# Patient Record
Sex: FEMALE | Race: WHITE | NOT HISPANIC OR LATINO | Employment: FULL TIME | URBAN - METROPOLITAN AREA
[De-identification: names, ages, dates, MRNs, and addresses within clinical notes are randomized per-mention and may not be internally consistent; named-entity substitution may affect disease eponyms.]

---

## 2018-03-16 ENCOUNTER — CONVERSION ENCOUNTER (OUTPATIENT)
Dept: CARDIOLOGY | Facility: CLINIC | Age: 48
End: 2018-03-16

## 2018-03-16 ENCOUNTER — OFFICE VISIT CONVERTED (OUTPATIENT)
Dept: CARDIOLOGY | Facility: CLINIC | Age: 48
End: 2018-03-16
Attending: INTERNAL MEDICINE

## 2018-06-29 ENCOUNTER — CONVERSION ENCOUNTER (OUTPATIENT)
Dept: GENERAL RADIOLOGY | Facility: HOSPITAL | Age: 48
End: 2018-06-29

## 2019-02-08 ENCOUNTER — HOSPITAL ENCOUNTER (OUTPATIENT)
Dept: LAB | Facility: HOSPITAL | Age: 49
Discharge: HOME | End: 2019-02-08

## 2019-02-08 LAB
25(OH)D3 SERPL-MCNC: 35.3 NG/ML (ref 30–100)
ALBUMIN SERPL-MCNC: 4.1 G/DL (ref 3.5–5)
ALBUMIN/GLOB SERPL: 1.3 {RATIO} (ref 1.4–2.6)
ALP SERPL-CCNC: 71 U/L (ref 42–98)
ALT SERPL-CCNC: 31 U/L (ref 10–40)
ANION GAP SERPL CALC-SCNC: 19 MMOL/L (ref 8–19)
AST SERPL-CCNC: 23 U/L (ref 15–50)
BASOPHILS # BLD AUTO: 0.06 10*3/UL (ref 0–0.2)
BASOPHILS NFR BLD AUTO: 0.76 % (ref 0–3)
BILIRUB SERPL-MCNC: 0.34 MG/DL (ref 0.2–1.3)
BUN SERPL-MCNC: 17 MG/DL (ref 5–25)
BUN/CREAT SERPL: 24 {RATIO} (ref 6–20)
CALCIUM SERPL-MCNC: 9.3 MG/DL (ref 8.7–10.4)
CHLORIDE SERPL-SCNC: 102 MMOL/L (ref 99–111)
CHOLEST SERPL-MCNC: 171 MG/DL (ref 107–200)
CHOLEST/HDLC SERPL: 4.3 {RATIO} (ref 3–6)
CONV BILI, CONJUGATED: <0.2 MG/DL (ref 0–0.6)
CONV CO2: 21 MMOL/L (ref 22–32)
CONV TOTAL PROTEIN: 7.2 G/DL (ref 6.3–8.2)
CONV UNCONJUGATED BILIRUBIN: 0.1 MG/DL (ref 0–1.1)
CORTIS AM PEAK SERPL-MCNC: 11.5 UG/DL (ref 6–18.4)
CREAT UR-MCNC: 0.71 MG/DL (ref 0.5–0.9)
EOSINOPHIL # BLD AUTO: 0.27 10*3/UL (ref 0–0.7)
EOSINOPHIL # BLD AUTO: 3.54 % (ref 0–7)
ERYTHROCYTE [DISTWIDTH] IN BLOOD BY AUTOMATED COUNT: 11.7 % (ref 11.5–14.5)
EST. AVERAGE GLUCOSE BLD GHB EST-MCNC: 108 MG/DL
GFR SERPLBLD BASED ON 1.73 SQ M-ARVRAT: >60 ML/MIN/{1.73_M2}
GLOBULIN UR ELPH-MCNC: 3.1 G/DL (ref 2–3.5)
GLUCOSE SERPL-MCNC: 106 MG/DL (ref 65–99)
HBA1C MFR BLD: 14 G/DL (ref 12–16)
HBA1C MFR BLD: 5.4 % (ref 3.5–5.7)
HCT VFR BLD AUTO: 39.5 % (ref 37–47)
HDLC SERPL-MCNC: 40 MG/DL (ref 40–60)
LDLC SERPL CALC-MCNC: 108 MG/DL (ref 70–100)
LYMPHOCYTES # BLD AUTO: 1.75 10*3/UL (ref 1–5)
MCH RBC QN AUTO: 29.7 PG (ref 27–31)
MCHC RBC AUTO-ENTMCNC: 35.5 G/DL (ref 33–37)
MCV RBC AUTO: 83.7 FL (ref 81–99)
MONOCYTES # BLD AUTO: 0.53 10*3/UL (ref 0.2–1.2)
MONOCYTES NFR BLD AUTO: 7.06 % (ref 3–10)
NEUTROPHILS # BLD AUTO: 4.88 10*3/UL (ref 2–8)
NEUTROPHILS NFR BLD AUTO: 65.3 % (ref 30–85)
NRBC BLD AUTO-RTO: 0 % (ref 0–0.01)
OSMOLALITY SERPL CALC.SUM OF ELEC: 288 MOSM/KG (ref 273–304)
PLATELET # BLD AUTO: 273 10*3/UL (ref 130–400)
PMV BLD AUTO: 8.4 FL (ref 7.4–10.4)
POTASSIUM SERPL-SCNC: 4 MMOL/L (ref 3.5–5.3)
RBC # BLD AUTO: 4.72 10*6/UL (ref 4.2–5.4)
SODIUM SERPL-SCNC: 138 MMOL/L (ref 135–147)
T4 FREE SERPL-MCNC: 1.2 NG/DL (ref 0.9–1.8)
TRIGL SERPL-MCNC: 113 MG/DL (ref 40–150)
TSH SERPL-ACNC: 0.77 M[IU]/L (ref 0.27–4.2)
VARIANT LYMPHS NFR BLD MANUAL: 23.3 % (ref 20–45)
VLDLC SERPL-MCNC: 23 MG/DL (ref 5–37)
WBC # BLD AUTO: 7.48 10*3/UL (ref 4.8–10.8)

## 2019-03-08 ENCOUNTER — OFFICE VISIT CONVERTED (OUTPATIENT)
Dept: FAMILY MEDICINE CLINIC | Facility: CLINIC | Age: 49
End: 2019-03-08
Attending: PHYSICIAN ASSISTANT

## 2019-05-21 ENCOUNTER — HOSPITAL ENCOUNTER (OUTPATIENT)
Dept: OTHER | Facility: HOSPITAL | Age: 49
Discharge: HOME | End: 2019-05-21
Attending: OBSTETRICS & GYNECOLOGY

## 2019-05-31 ENCOUNTER — HOSPITAL ENCOUNTER (OUTPATIENT)
Dept: PERIOP | Facility: HOSPITAL | Age: 49
Setting detail: HOSPITAL OUTPATIENT SURGERY
Discharge: HOME | End: 2019-05-31
Attending: OBSTETRICS & GYNECOLOGY

## 2019-05-31 LAB — HCG UR QL: NEGATIVE

## 2019-06-19 ENCOUNTER — OFFICE VISIT CONVERTED (OUTPATIENT)
Dept: CARDIOLOGY | Facility: CLINIC | Age: 49
End: 2019-06-19
Attending: INTERNAL MEDICINE

## 2019-08-02 ENCOUNTER — HOSPITAL ENCOUNTER (OUTPATIENT)
Dept: GENERAL RADIOLOGY | Facility: HOSPITAL | Age: 49
Discharge: HOME | End: 2019-08-02
Attending: OBSTETRICS & GYNECOLOGY

## 2019-08-19 ENCOUNTER — HOSPITAL ENCOUNTER (OUTPATIENT)
Dept: URGENT CARE | Facility: CLINIC | Age: 49
Discharge: HOME | End: 2019-08-19
Attending: FAMILY MEDICINE

## 2019-09-24 ENCOUNTER — HOSPITAL ENCOUNTER (OUTPATIENT)
Dept: LAB | Facility: HOSPITAL | Age: 49
Discharge: HOME | End: 2019-09-24
Attending: PHYSICIAN ASSISTANT

## 2019-09-24 ENCOUNTER — OFFICE VISIT CONVERTED (OUTPATIENT)
Dept: FAMILY MEDICINE CLINIC | Facility: CLINIC | Age: 49
End: 2019-09-24
Attending: PHYSICIAN ASSISTANT

## 2019-09-24 LAB
25(OH)D3 SERPL-MCNC: 41.3 NG/ML (ref 30–100)
ALBUMIN SERPL-MCNC: 4.2 G/DL (ref 3.5–5)
ALBUMIN/GLOB SERPL: 1.4 {RATIO} (ref 1.4–2.6)
ALP SERPL-CCNC: 90 U/L (ref 42–98)
ALT SERPL-CCNC: 16 U/L (ref 10–40)
ANION GAP SERPL CALC-SCNC: 17 MMOL/L (ref 8–19)
APPEARANCE UR: CLEAR
AST SERPL-CCNC: 17 U/L (ref 15–50)
BASOPHILS # BLD AUTO: 0.06 10*3/UL (ref 0–0.2)
BASOPHILS NFR BLD AUTO: 0.9 % (ref 0–3)
BILIRUB SERPL-MCNC: 0.29 MG/DL (ref 0.2–1.3)
BILIRUB UR QL: NEGATIVE
BUN SERPL-MCNC: 12 MG/DL (ref 5–25)
BUN/CREAT SERPL: 17 {RATIO} (ref 6–20)
CALCIUM SERPL-MCNC: 9 MG/DL (ref 8.7–10.4)
CHLORIDE SERPL-SCNC: 102 MMOL/L (ref 99–111)
CHOLEST SERPL-MCNC: 147 MG/DL (ref 107–200)
CHOLEST/HDLC SERPL: 3.5 {RATIO} (ref 3–6)
COLOR UR: YELLOW
CONV ABS IMM GRAN: 0.02 10*3/UL (ref 0–0.2)
CONV CO2: 23 MMOL/L (ref 22–32)
CONV COLLECTION SOURCE (UA): NORMAL
CONV IMMATURE GRAN: 0.3 % (ref 0–1.8)
CONV TOTAL PROTEIN: 7.1 G/DL (ref 6.3–8.2)
CONV UROBILINOGEN IN URINE BY AUTOMATED TEST STRIP: 0.2 {EHRLICHU}/DL (ref 0.1–1)
CREAT UR-MCNC: 0.69 MG/DL (ref 0.5–0.9)
DEPRECATED RDW RBC AUTO: 40 FL (ref 36.4–46.3)
EOSINOPHIL # BLD AUTO: 0.17 10*3/UL (ref 0–0.7)
EOSINOPHIL # BLD AUTO: 2.7 % (ref 0–7)
ERYTHROCYTE [DISTWIDTH] IN BLOOD BY AUTOMATED COUNT: 12.5 % (ref 11.7–14.4)
GFR SERPLBLD BASED ON 1.73 SQ M-ARVRAT: >60 ML/MIN/{1.73_M2}
GLOBULIN UR ELPH-MCNC: 2.9 G/DL (ref 2–3.5)
GLUCOSE SERPL-MCNC: 98 MG/DL (ref 65–99)
GLUCOSE UR QL: NEGATIVE MG/DL
HCT VFR BLD AUTO: 39.8 % (ref 37–47)
HDLC SERPL-MCNC: 42 MG/DL (ref 40–60)
HGB BLD-MCNC: 13.1 G/DL (ref 12–16)
HGB UR QL STRIP: NEGATIVE
KETONES UR QL STRIP: NEGATIVE MG/DL
LDLC SERPL CALC-MCNC: 84 MG/DL (ref 70–100)
LEUKOCYTE ESTERASE UR QL STRIP: NEGATIVE
LYMPHOCYTES # BLD AUTO: 1.99 10*3/UL (ref 1–5)
LYMPHOCYTES NFR BLD AUTO: 31.3 % (ref 20–45)
MCH RBC QN AUTO: 28.7 PG (ref 27–31)
MCHC RBC AUTO-ENTMCNC: 32.9 G/DL (ref 33–37)
MCV RBC AUTO: 87.3 FL (ref 81–99)
MONOCYTES # BLD AUTO: 0.4 10*3/UL (ref 0.2–1.2)
MONOCYTES NFR BLD AUTO: 6.3 % (ref 3–10)
NEUTROPHILS # BLD AUTO: 3.71 10*3/UL (ref 2–8)
NEUTROPHILS NFR BLD AUTO: 58.5 % (ref 30–85)
NITRITE UR QL STRIP: NEGATIVE
NRBC CBCN: 0 % (ref 0–0.7)
OSMOLALITY SERPL CALC.SUM OF ELEC: 286 MOSM/KG (ref 273–304)
PH UR STRIP.AUTO: 8 [PH] (ref 5–8)
PLATELET # BLD AUTO: 298 10*3/UL (ref 130–400)
PMV BLD AUTO: 11.3 FL (ref 9.4–12.3)
POTASSIUM SERPL-SCNC: 4 MMOL/L (ref 3.5–5.3)
PROT UR QL: NEGATIVE MG/DL
RBC # BLD AUTO: 4.56 10*6/UL (ref 4.2–5.4)
SODIUM SERPL-SCNC: 138 MMOL/L (ref 135–147)
SP GR UR: 1.02 (ref 1–1.03)
T4 FREE SERPL-MCNC: 1.1 NG/DL (ref 0.9–1.8)
TRIGL SERPL-MCNC: 107 MG/DL (ref 40–150)
TSH SERPL-ACNC: 1.34 M[IU]/L (ref 0.27–4.2)
VLDLC SERPL-MCNC: 21 MG/DL (ref 5–37)
WBC # BLD AUTO: 6.35 10*3/UL (ref 4.8–10.8)

## 2019-10-15 ENCOUNTER — CONVERSION ENCOUNTER (OUTPATIENT)
Dept: SURGERY | Facility: CLINIC | Age: 49
End: 2019-10-15

## 2019-10-15 ENCOUNTER — OFFICE VISIT CONVERTED (OUTPATIENT)
Dept: SURGERY | Facility: CLINIC | Age: 49
End: 2019-10-15
Attending: SURGERY

## 2019-12-27 ENCOUNTER — HOSPITAL ENCOUNTER (OUTPATIENT)
Dept: PERIOP | Facility: HOSPITAL | Age: 49
Setting detail: HOSPITAL OUTPATIENT SURGERY
Discharge: HOME | End: 2019-12-27
Attending: SURGERY

## 2019-12-27 LAB — HCG UR QL: NEGATIVE

## 2020-01-10 ENCOUNTER — OFFICE VISIT CONVERTED (OUTPATIENT)
Dept: SURGERY | Facility: CLINIC | Age: 50
End: 2020-01-10
Attending: SURGERY

## 2020-07-28 ENCOUNTER — HOSPITAL ENCOUNTER (OUTPATIENT)
Dept: LAB | Facility: HOSPITAL | Age: 50
Discharge: HOME | End: 2020-07-28

## 2020-07-28 LAB
25(OH)D3 SERPL-MCNC: 47.6 NG/ML (ref 30–100)
CORTIS SERPL-MCNC: 15.8 UG/DL (ref 4.3–22.4)
T4 FREE SERPL-MCNC: 1 NG/DL (ref 0.9–1.8)
TSH SERPL-ACNC: 1.38 M[IU]/L (ref 0.27–4.2)

## 2020-07-29 LAB — T3FREE SERPL-MCNC: 2.7 PG/ML (ref 2–4.4)

## 2020-08-11 ENCOUNTER — HOSPITAL ENCOUNTER (OUTPATIENT)
Dept: GENERAL RADIOLOGY | Facility: HOSPITAL | Age: 50
Discharge: HOME | End: 2020-08-11
Attending: OBSTETRICS & GYNECOLOGY

## 2020-08-13 ENCOUNTER — OFFICE VISIT CONVERTED (OUTPATIENT)
Dept: CARDIOLOGY | Facility: CLINIC | Age: 50
End: 2020-08-13
Attending: INTERNAL MEDICINE

## 2020-10-20 ENCOUNTER — HOSPITAL ENCOUNTER (OUTPATIENT)
Dept: URGENT CARE | Facility: CLINIC | Age: 50
Discharge: HOME | End: 2020-10-20

## 2020-10-23 LAB — SARS-COV-2 RNA SPEC QL NAA+PROBE: NOT DETECTED

## 2020-12-17 ENCOUNTER — OFFICE VISIT CONVERTED (OUTPATIENT)
Dept: FAMILY MEDICINE CLINIC | Facility: CLINIC | Age: 50
End: 2020-12-17
Attending: PHYSICIAN ASSISTANT

## 2020-12-17 ENCOUNTER — HOSPITAL ENCOUNTER (OUTPATIENT)
Dept: LAB | Facility: HOSPITAL | Age: 50
Discharge: HOME | End: 2020-12-17
Attending: PHYSICIAN ASSISTANT

## 2020-12-17 LAB
25(OH)D3 SERPL-MCNC: 33.4 NG/ML (ref 30–100)
ALBUMIN SERPL-MCNC: 4 G/DL (ref 3.5–5)
ALBUMIN/GLOB SERPL: 1.3 {RATIO} (ref 1.4–2.6)
ALP SERPL-CCNC: 102 U/L (ref 42–98)
ALT SERPL-CCNC: 13 U/L (ref 10–40)
ANION GAP SERPL CALC-SCNC: 16 MMOL/L (ref 8–19)
APPEARANCE UR: ABNORMAL
AST SERPL-CCNC: 15 U/L (ref 15–50)
BASOPHILS # BLD AUTO: 0.06 10*3/UL (ref 0–0.2)
BASOPHILS NFR BLD AUTO: 0.7 % (ref 0–3)
BILIRUB SERPL-MCNC: 0.23 MG/DL (ref 0.2–1.3)
BILIRUB UR QL: NEGATIVE
BUN SERPL-MCNC: 12 MG/DL (ref 5–25)
BUN/CREAT SERPL: 20 {RATIO} (ref 6–20)
CALCIUM SERPL-MCNC: 8.5 MG/DL (ref 8.7–10.4)
CHLORIDE SERPL-SCNC: 104 MMOL/L (ref 99–111)
CHOLEST SERPL-MCNC: 188 MG/DL (ref 107–200)
CHOLEST/HDLC SERPL: 3.9 {RATIO} (ref 3–6)
COLOR UR: YELLOW
CONV ABS IMM GRAN: 0.03 10*3/UL (ref 0–0.2)
CONV BACTERIA: ABNORMAL
CONV CO2: 21 MMOL/L (ref 22–32)
CONV COLLECTION SOURCE (UA): ABNORMAL
CONV CRYSTALS: ABNORMAL /[HPF]
CONV HYALINE CASTS IN URINE MICRO: ABNORMAL /[LPF]
CONV IMMATURE GRAN: 0.3 % (ref 0–1.8)
CONV TOTAL PROTEIN: 7 G/DL (ref 6.3–8.2)
CONV UROBILINOGEN IN URINE BY AUTOMATED TEST STRIP: 0.2 {EHRLICHU}/DL (ref 0.1–1)
CREAT UR-MCNC: 0.61 MG/DL (ref 0.5–0.9)
DEPRECATED RDW RBC AUTO: 39.7 FL (ref 36.4–46.3)
EOSINOPHIL # BLD AUTO: 0.18 10*3/UL (ref 0–0.7)
EOSINOPHIL # BLD AUTO: 2 % (ref 0–7)
ERYTHROCYTE [DISTWIDTH] IN BLOOD BY AUTOMATED COUNT: 12.6 % (ref 11.7–14.4)
GFR SERPLBLD BASED ON 1.73 SQ M-ARVRAT: >60 ML/MIN/{1.73_M2}
GLOBULIN UR ELPH-MCNC: 3 G/DL (ref 2–3.5)
GLUCOSE SERPL-MCNC: 103 MG/DL (ref 65–99)
GLUCOSE UR QL: NEGATIVE MG/DL
HCT VFR BLD AUTO: 39.4 % (ref 37–47)
HDLC SERPL-MCNC: 48 MG/DL (ref 40–60)
HGB BLD-MCNC: 13 G/DL (ref 12–16)
HGB UR QL STRIP: NEGATIVE
KETONES UR QL STRIP: NEGATIVE MG/DL
LDLC SERPL CALC-MCNC: 109 MG/DL (ref 70–100)
LEUKOCYTE ESTERASE UR QL STRIP: ABNORMAL
LYMPHOCYTES # BLD AUTO: 1.8 10*3/UL (ref 1–5)
LYMPHOCYTES NFR BLD AUTO: 19.5 % (ref 20–45)
MCH RBC QN AUTO: 28.6 PG (ref 27–31)
MCHC RBC AUTO-ENTMCNC: 33 G/DL (ref 33–37)
MCV RBC AUTO: 86.6 FL (ref 81–99)
MONOCYTES # BLD AUTO: 0.39 10*3/UL (ref 0.2–1.2)
MONOCYTES NFR BLD AUTO: 4.2 % (ref 3–10)
NEUTROPHILS # BLD AUTO: 6.77 10*3/UL (ref 2–8)
NEUTROPHILS NFR BLD AUTO: 73.3 % (ref 30–85)
NITRITE UR QL STRIP: NEGATIVE
NRBC CBCN: 0 % (ref 0–0.7)
OSMOLALITY SERPL CALC.SUM OF ELEC: 284 MOSM/KG (ref 273–304)
PH UR STRIP.AUTO: 5 [PH] (ref 5–8)
PLATELET # BLD AUTO: 331 10*3/UL (ref 130–400)
PMV BLD AUTO: 11 FL (ref 9.4–12.3)
POTASSIUM SERPL-SCNC: 4 MMOL/L (ref 3.5–5.3)
PROT UR QL: NEGATIVE MG/DL
RBC # BLD AUTO: 4.55 10*6/UL (ref 4.2–5.4)
RBC #/AREA URNS HPF: ABNORMAL /[HPF]
SODIUM SERPL-SCNC: 137 MMOL/L (ref 135–147)
SP GR UR: 1.02 (ref 1–1.03)
SQUAMOUS SPT QL MICRO: ABNORMAL /[HPF]
T4 FREE SERPL-MCNC: 1 NG/DL (ref 0.9–1.8)
TRIGL SERPL-MCNC: 154 MG/DL (ref 40–150)
TSH SERPL-ACNC: 2.82 M[IU]/L (ref 0.27–4.2)
VLDLC SERPL-MCNC: 31 MG/DL (ref 5–37)
WBC # BLD AUTO: 9.23 10*3/UL (ref 4.8–10.8)
WBC #/AREA URNS HPF: ABNORMAL /[HPF]

## 2020-12-22 ENCOUNTER — HOSPITAL ENCOUNTER (OUTPATIENT)
Dept: LAB | Facility: HOSPITAL | Age: 50
Discharge: HOME | End: 2020-12-22
Attending: PHYSICIAN ASSISTANT

## 2020-12-22 LAB
MAGNESIUM SERPL-MCNC: 1.91 MG/DL (ref 1.6–2.3)
PTH-INTACT SERPL-MCNC: 51.7 PG/ML (ref 11.1–79.5)

## 2020-12-23 LAB — CONV CALCIUM IONIZED: 5 MG/DL (ref 4.5–5.6)

## 2020-12-24 LAB
ALP BONE CFR SERPL: 18 % (ref 14–68)
ALP INTEST CFR SERPL: 4 % (ref 0–18)
ALP LIVER CFR SERPL: 77 % (ref 18–85)
ALP SERPL-CCNC: 93 IU/L (ref 39–117)
BACTERIA UR CULT: NORMAL

## 2021-03-19 ENCOUNTER — OFFICE VISIT CONVERTED (OUTPATIENT)
Dept: FAMILY MEDICINE CLINIC | Facility: CLINIC | Age: 51
End: 2021-03-19
Attending: PHYSICIAN ASSISTANT

## 2021-05-13 NOTE — PROGRESS NOTES
"   Progress Note      Patient Name: Livia Zaman   Patient ID: [id number]   Sex: Female   YOB: 1970    Primary Care Provider: Zander Valenzuela PA-C   Referring Provider: Zander Valenzuela PA-C    Visit Date: August 13, 2020    Provider: Marty Muñoz MD   Location: [facility]   Location Address: [address] [place] [address], [facility]   [place]  [id number]   Location Phone: [phone]          Chief Complaint  · PVCs.       History Of Present Illness  REFERRING CARE PROVIDER: Zander Valenzuela PA-C   Livia Zaman is a 49 year old /White female with a history of PVCs, obstructive sleep apnea, on CPAP, and some type of neuroendocrine tumor, which is being followed but has recently increased in size. The patient does report stable palpitation issues.   PAST MEDICAL HISTORY: Hypothyroidism; Neuroendocrine tumor; PVCs; Obstructive sleep apnea.   FAMILY HISTORY: Positive for hypertension and heart disease. Negative for diabetes mellitus.   PSYCHOSOCIAL HISTORY: Denies tobacco use. Rarely consumes alcohol. Admits mood changes and depression.   CURRENT MEDICATIONS: Lo Loestrin daily; Ativan 1 mg q. h.s.; levothyroxine 125 mcg daily; Lexapro 20 mg daily.       Review of Systems  · Cardiovascular  o Admits  o : palpitations (fast, fluttering, or skipping beats)  o Denies  o : swelling (feet, ankles, hands), shortness of breath while walking or lying flat, chest pain or angina pectoris   · Respiratory  o Denies  o : chronic or frequent cough, asthma or wheezing      Vitals  Date Time BP Position Site L\R Cuff Size HR RR TEMP (F) WT  HT  BMI kg/m2 BSA m2 O2 Sat        08/13/2020 08:27 /64 Sitting    74 - R   229lbs 0oz 5'  3\" 40.57 2.15           Physical Examination  · Constitutional  o Appearance  o : Awake, alert, in no acute distress.   · Eyes  o Conjunctivae  o : Normal.  · Ears, Nose, Mouth and Throat  o Oral Cavity  o :   § Oral Mucosa  § : " Normal.  · Neck  o Inspection/Palpation  o : No JVD. Good carotid upstroke. No thyromegaly.  · Respiratory  o Respiratory  o : Good respiratory effort. Clear to percussion and auscultation.  · Cardiovascular  o Heart  o :   § Auscultation of Heart  § : S1, S2 normal. Regular rate and rhythm without murmurs, gallops, or rubs.  o Peripheral Vascular System  o :   § Extremities  § : Good femoral and pedal pulses. No pedal edema.  · Gastrointestinal  o Abdominal Examination  o : Soft. No tenderness or masses felt. No hepatosplenomegaly. Abdominal aorta is not palpable.          Assessment     ASSESSMENT & PLAN:    1.  PVCs, symptomatically stable.  Patient was not interested in trying any rate-control medications for        symptom treatment.  Encouraged weight loss and exercise and avoidance of stimulants.  2.  Neuroendocrine tumor.        Marty Muñoz MD  JH:vm             Electronically Signed by: Awilda Reese-, Other -Author on August 17, 2020 10:10:49 AM  Electronically Co-signed by: Marty Muñoz MD -Reviewer on August 17, 2020 05:46:53 PM

## 2021-05-14 VITALS
OXYGEN SATURATION: 98 % | WEIGHT: 220 LBS | BODY MASS INDEX: 38.98 KG/M2 | DIASTOLIC BLOOD PRESSURE: 77 MMHG | HEART RATE: 94 BPM | HEIGHT: 63 IN | SYSTOLIC BLOOD PRESSURE: 137 MMHG

## 2021-05-14 VITALS
OXYGEN SATURATION: 97 % | BODY MASS INDEX: 42.35 KG/M2 | SYSTOLIC BLOOD PRESSURE: 118 MMHG | HEART RATE: 90 BPM | DIASTOLIC BLOOD PRESSURE: 75 MMHG | HEIGHT: 63 IN | WEIGHT: 239 LBS

## 2021-05-14 NOTE — PROGRESS NOTES
Progress Note      Patient Name: Livia Zaman   Patient ID: [id number]   Sex: Female   YOB: 1970    Primary Care Provider: Zander Valenzuela PA-C   Referring Provider: Zander Valenzuela PA-C    Visit Date: March 19, 2021    Provider: Zander Valenzuela PA-C   [place] [facility] [place] Address[place] [address] [place] [address], [address]  [place], [place]  [id number]   Location Phone: [phone]          Chief Complaint  · routine follow up  · bilateral ears itch      History Of Present Illness  Livia Zaman is a 50 year old /White female who presents for evaluation and treatment of: routine follow up.      pt presents today for routine follow up.    pt states both of her ears have been itching for a couple of weeks.    no other issues or complaint    pt has had both Covid vacc  Anxiety and depression controlled with meds and seeing psych on a regular basis    Labs 12/20  flu 10/20  wade 12/20  UDS 12/20    Pt is doing well overall, she is working as home health nurse    No CP, SOA, HA    Pancreatic mass stable, has CT every 6 mos.    Obesity - pt has lost 19 lbs since being phentermine, she wishes to continue for another couple of mos.       Past Medical History  Disease Name Date Onset Notes   Allergic Rhinitis --  --    Anxiety --  --    Hypothyroidism 03/08/2019 --    Insomnia --  --    Pancreatic lesion 09/25/2019 --          Past Surgical History  Procedure Name Date Notes   Cesarian Section --  --    Cholecystectomy --  --          Medication List  Name Date Started Instructions   Ativan 1 mg oral tablet  take 1-2 tablets by oral route daily   escitalopram oxalate 20 mg oral tablet  take 1.5 tablets by oral route daily   levothyroxine 125 mcg oral tablet 11/09/2020 take 1 tablet (125 mcg) by oral route once daily for 90 days   Low-Ogestrel (28) 0.3-30 mg-mcg oral tablet  take 1 tablet by oral route once daily   phentermine 37.5 mg oral tablet 03/19/2021 take 1 tablet (37.5 mg) by  "oral route once daily one or two hours after breakfast   Zyrtec 10 mg oral tablet  take 1 tablet (10 mg) by oral route once daily         Allergy List  Allergen Name Date Reaction Notes   Pristiq --  --  --          Family Medical History  Disease Name Relative/Age Notes   Heart Disease Father/  Grandfather (maternal)/  Grandfather (paternal)/  Grandmother (paternal)/   --    Hypertension Aunt/  Grandfather (maternal)/  Grandmother (maternal)/  Mother/   --    Prostate cancer Father/  Grandfather (maternal)/   --    Skin Carcinoma Father/  Uncle/   --          Social History  Finding Status Start/Stop Quantity Notes   Alcohol Never --/-- --  --    Exercises regularly --  --/-- --  2-3 times per week    --  --/-- --  --    Tobacco Never --/-- --  --          Review of Systems  · Constitutional  o Admits  o : weight loss  o Denies  o : fever, fatigue, weight gain  · Cardiovascular  o Denies  o : lower extremity edema, claudication, chest pressure, palpitations  · Respiratory  o Denies  o : shortness of breath, wheezing, cough, hemoptysis, dyspnea on exertion  · Gastrointestinal  o Denies  o : nausea, vomiting, diarrhea, constipation, abdominal pain      Vitals  Date Time BP Position Site L\R Cuff Size HR RR TEMP (F) WT  HT  BMI kg/m2 BSA m2 O2 Sat FR L/min FiO2 HC       03/19/2021 07:21 /77 Sitting    94 - R   220lbs 0oz 5'  3\" 38.97 2.11 98 %            Physical Examination  · Constitutional  o Appearance  o : overweight, well developed  · Head and Face  o Head  o : normocephalic, atraumatic  · Ears, Nose, Mouth and Throat  o Ears  o :   § External Ears  § : external auditory canal appearance normal, no discharge present  § Otoscopic Examination  § : tympanic membranes pearly white/gray bilaterally  o Nose  o :   § External Nose  § : no lesions noted  § Nasopharynx  § : no discharge present  o Oral Cavity  o :   § Oral Mucosa  § : oral mucosa light pink  o Throat  o :   § Oropharynx  § : tonsils without " exudate, no palatal petechiae  · Neck  o Inspection/Palpation  o : normal appearance, no masses or tenderness, trachea midline  o Thyroid  o : gland size normal, nontender, no nodules or masses present on palpation  · Respiratory  o Respiratory Effort  o : breathing unlabored  o Inspection of Chest  o : chest rise symmetric bilaterally  o Auscultation of Lungs  o : clear to auscultation bilaterally throughout inspiration and expiration  · Cardiovascular  o Heart  o :   § Auscultation of Heart  § : regular rate and rhythm, no murmurs, gallops or rubs  o Peripheral Vascular System  o :   § Extremities  § : no edema  · Lymphatic  o Neck  o : no cervical lymphadenopathy, no supraclavicular lymphadenopathy  · Psychiatric  o Mood and Affect  o : mood normal, affect appropriate          Assessment  · Hypothyroidism     244.9/E03.9  · Insomnia, unspecified     780.52/G47.00  · Class 2 severe obesity due to excess calories with serious comorbidity and body mass index (BMI) of 38.0 to 38.9 in adult       Morbid (severe) obesity due to excess calories     278.01/E66.01  Body mass index [BMI] 38.0-38.9, adult     278.01/Z68.38  · Screening for depression     V79.0/Z13.89  · Need for influenza vaccination     V04.81/Z23  · Pancreatic lesion     577.9/K86.9      Plan  · Orders  o ACO-18: Negative screen for clinical depression using a standardized tool () - V79.0/Z13.89 - 03/19/2021  o ACO-14: Influenza immunization was not administered for reasons documented () - V04.81/Z23 - 03/19/2021   rcvd 10/20  o MANUEL Report (KASPR) - - 03/19/2021  o ACO-39: Current medications updated and reviewed (1159F, ) - - 03/19/2021  · Medications  o phentermine 37.5 mg oral tablet   SIG: take 1 tablet (37.5 mg) by oral route once daily one or two hours after breakfast   DISP: (30) Tablet with 2 refills  Refilled on 03/19/2021     o Medications have been Reconciled  o Transition of Care or Provider Policy  · Instructions  o Avoid  any electronic use for at least 30 minutes prior to bed time. Cell phone screens, tablets and TVs imitate daylight, so your brain can become confused on the time of day. No caffeine use in the late afternoon and evenings.  o Depression Screen completed and scanned into the EMR under the designated folder within the patient's documents.  o Today's PHQ-9 result is _1__  o Flu vaccine declined.  o Obtained a written consent for MANUEL query. Discussed the risk and benefits of the use of controlled substances with the patient, including the risk of tolerance and drug dependence. The patient has been counseled on the need to have an exit strategy, including potentially discontinuing the use of controlled substances. MANUEL has or will be reviewed as soon as it becomes avaliable.  o See note for indication of controlled substance. Manuel and drug screen have been reviewed. Controlled substance agreement signed and scanned into chart. After discussion of risks and benefits of medication, I have determined patient is suitable for Rx while demonstrating the ability to safely follow and administer the medication plan. Patient understands the expectation that medication directions cannot be adjusted without a provider's written approval.   o Take all medications as prescribed/directed.  o Patient instructed/educated on their diet and exercise program.  o Patient was educated/instructed on their diagnosis, treatment and medications prior to discharge from the clinic today.  o Discussed Covid-19 precautions including, but not limited to, social distancing, avoid touching your face, and hand washing.             Electronically Signed by: Zander Valenzuela PA-C -Author on March 20, 2021 09:19:36 AM

## 2021-05-14 NOTE — PROGRESS NOTES
Progress Note      Patient Name: Livia Zaman   Patient ID: [id number]   Sex: Female   YOB: 1970    Primary Care Provider: Zander Valenzuela PA-C   Referring Provider: Zander Valenzuela PA-C    Visit Date: December 17, 2020    Provider: Zander Valenzuela PA-C   Location: [facility]   Location Address: [address] [place] [address], [address]  [place], [place]  [id number]   Location Phone: [phone]          Chief Complaint  · routine follow up  · weightloss issues      History Of Present Illness  Livia Zaman is a 50 year old /White female who presents for evaluation and treatment of: routine follow up.      pt presents today for routine follow up.    pt states she would like to discuss weightloss issues, nothing seems to work.    pt has a liver bx on Oct 2nd which was benign.    Labs 9/19  mammo-8/20-Neithers  Flu 9/20    Pt has been working at health dept.  She hopes to get her covid vaccine next week    Neuroendocrine tumor - dx recently with nonalcoholic fatty liver.  She is struggling with wt    Jody Keller - psych  Second opinion - no whipple - tumor in pancreas is growing slowly.       Past Medical History  Disease Name Date Onset Notes   Allergic Rhinitis --  --    Anxiety --  --    Hypothyroidism 03/08/2019 --    Insomnia --  --    Pancreatic lesion 09/25/2019 --          Past Surgical History  Procedure Name Date Notes   Cesarian Section --  --    Cholecystectomy --  --          Medication List  Name Date Started Instructions   Ativan 1 mg oral tablet  take 1-2 tablets by oral route daily   escitalopram oxalate 20 mg oral tablet  take 1.5 tablets by oral route daily   levothyroxine 125 mcg oral tablet 11/09/2020 take 1 tablet (125 mcg) by oral route once daily for 90 days   Low-Ogestrel (28) 0.3-30 mg-mcg oral tablet  take 1 tablet by oral route once daily   Zyrtec 10 mg oral tablet  take 1 tablet (10 mg) by oral route once daily         Allergy List  Allergen Name Date  "Reaction Notes   Pristiq --  --  --          Family Medical History  Disease Name Relative/Age Notes   Heart Disease Father/  Grandfather (maternal)/  Grandfather (paternal)/  Grandmother (paternal)/   --    Hypertension Aunt/  Grandfather (maternal)/  Grandmother (maternal)/  Mother/   --    Prostate cancer Father/  Grandfather (maternal)/   --    Skin Carcinoma Father/  Uncle/   --          Social History  Finding Status Start/Stop Quantity Notes   Alcohol Never --/-- --  --    Exercises regularly --  --/-- --  2-3 times per week    --  --/-- --  --    Tobacco Never --/-- --  --          Review of Systems  · Constitutional  o Denies  o : fever, fatigue, weight loss, weight gain  · Cardiovascular  o Denies  o : lower extremity edema, claudication, chest pressure, palpitations  · Respiratory  o Denies  o : shortness of breath, wheezing, cough, hemoptysis, dyspnea on exertion  · Gastrointestinal  o Denies  o : nausea, vomiting, diarrhea, constipation, abdominal pain      Vitals  Date Time BP Position Site L\R Cuff Size HR RR TEMP (F) WT  HT  BMI kg/m2 BSA m2 O2 Sat FR L/min FiO2 HC       12/17/2020 08:35 /75 Sitting    90 - R   239lbs 0oz 5'  3\" 42.34 2.2 97 %            Physical Examination  · Constitutional  o Appearance  o : overweight, well developed  · Head and Face  o Head  o : normocephalic, atraumatic  · Neck  o Inspection/Palpation  o : normal appearance, no masses or tenderness, trachea midline  o Thyroid  o : gland size normal, nontender, no nodules or masses present on palpation  · Respiratory  o Respiratory Effort  o : breathing unlabored  o Inspection of Chest  o : chest rise symmetric bilaterally  o Auscultation of Lungs  o : clear to auscultation bilaterally throughout inspiration and expiration  · Cardiovascular  o Heart  o :   § Auscultation of Heart  § : regular rate and rhythm, no murmurs, gallops or rubs  o Peripheral Vascular System  o :   § Extremities  § : no " edema  · Lymphatic  o Neck  o : no cervical lymphadenopathy, no supraclavicular lymphadenopathy  · Psychiatric  o Mood and Affect  o : mood normal, affect appropriate          Assessment  · Anxiety disorder     300.00/F41.9  · Hypothyroidism     244.9/E03.9  · Insomnia, unspecified     780.52/G47.00  · Class 3 severe obesity due to excess calories with serious comorbidity and body mass index (BMI) of 40.0 to 44.9 in adult       Morbid (severe) obesity due to excess calories     278.01/E66.01  Body mass index (BMI) 40.0-44.9, adult     278.01/Z68.41  · Screening for depression     V79.0/Z13.89  · Need for influenza vaccination     V04.81/Z23  · Pancreatic lesion     577.9/K86.9      Plan  · Orders  o ACO-14: Influenza immunization was not administered for reasons documented () - V04.81/Z23 - 12/17/2020   rcvd 9/20  o Free T4 (94308) - - 12/17/2020  o Physical, Primary Care Panel (CBC, CMP, Lipid, TSH) [facility] (71100, 05630, 16556, 49451) - - 12/17/2020  o Urinalysis with Reflex Microscopy ([facility]) (88007) - - 12/17/2020  o Vitamin D (25-Hydroxy) Level (04369) - - 12/17/2020  o MANUEL Report (KASPR) - - 12/17/2020  o ACO-39: Current medications updated and reviewed (1159F, ) - - 12/17/2020  o ACO-20: Screening Mammography documented and reviewed [facility] () - - 12/17/2020  o Urine Drug Screen ([facility]) (59422) - - 12/17/2020  · Medications  o phentermine 37.5 mg oral tablet   SIG: take 1 tablet (37.5 mg) by oral route once daily one or two hours after breakfast   DISP: (30) Tablet with 2 refills  Prescribed on 12/17/2020     o Medications have been Reconciled  o Transition of Care or Provider Policy  · Instructions  o Avoid any electronic use for at least 30 minutes prior to bed time. Cell phone screens, tablets and TVs imitate daylight, so your brain can become confused on the time of day. No caffeine use in the late afternoon and evenings.  o Depression Screen completed and scanned into the EMR under the designated  folder within the patient's documents.  o Today's PHQ-9 result is _5__  o Flu vaccine declined.  o Obtained a written consent for MANUEL query. Discussed the risk and benefits of the use of controlled substances with the patient, including the risk of tolerance and drug dependence. The patient has been counseled on the need to have an exit strategy, including potentially discontinuing the use of controlled substances. MANUEL has or will be reviewed as soon as it becomes avaliable.  o See note for indication of controlled substance. Manuel and drug screen have been reviewed. Controlled substance agreement signed and scanned into chart. After discussion of risks and benefits of medication, I have determined patient is suitable for Rx while demonstrating the ability to safely follow and administer the medication plan. Patient understands the expectation that medication directions cannot be adjusted without a provider's written approval.   o Take all medications as prescribed/directed.  o Patient instructed/educated on their diet and exercise program.  o Patient was educated/instructed on their diagnosis, treatment and medications prior to discharge from the clinic today.  o Patient counseled to reduce calorie intake.  o Patient was instructed to exercise regularly.  o Discussed Covid-19 precautions including, but not limited to, social distancing, avoid touching your face, and hand washing.   · Disposition  o Call or Return if symptoms worsen or persist.  o F/U in 3 months.  o Care Transition            Electronically Signed by: Zander Valenzuela PA-C -Author on December 17, 2020 01:22:39 PM

## 2021-05-15 VITALS
SYSTOLIC BLOOD PRESSURE: 120 MMHG | WEIGHT: 210 LBS | HEART RATE: 78 BPM | HEIGHT: 63 IN | BODY MASS INDEX: 37.21 KG/M2 | DIASTOLIC BLOOD PRESSURE: 84 MMHG

## 2021-05-15 VITALS — HEIGHT: 62 IN | BODY MASS INDEX: 37.77 KG/M2 | RESPIRATION RATE: 16 BRPM | WEIGHT: 205.25 LBS

## 2021-05-15 VITALS
SYSTOLIC BLOOD PRESSURE: 115 MMHG | OXYGEN SATURATION: 98 % | DIASTOLIC BLOOD PRESSURE: 75 MMHG | WEIGHT: 207.37 LBS | HEIGHT: 62 IN | BODY MASS INDEX: 38.16 KG/M2 | HEART RATE: 98 BPM

## 2021-05-15 VITALS
DIASTOLIC BLOOD PRESSURE: 74 MMHG | BODY MASS INDEX: 36.19 KG/M2 | OXYGEN SATURATION: 99 % | HEIGHT: 63 IN | WEIGHT: 204.25 LBS | HEART RATE: 84 BPM | SYSTOLIC BLOOD PRESSURE: 128 MMHG

## 2021-05-15 VITALS — BODY MASS INDEX: 38.5 KG/M2 | HEIGHT: 62 IN | WEIGHT: 209.25 LBS | RESPIRATION RATE: 12 BRPM

## 2021-05-15 VITALS
BODY MASS INDEX: 40.57 KG/M2 | DIASTOLIC BLOOD PRESSURE: 64 MMHG | HEIGHT: 63 IN | SYSTOLIC BLOOD PRESSURE: 122 MMHG | WEIGHT: 229 LBS | HEART RATE: 74 BPM

## 2021-05-16 VITALS
BODY MASS INDEX: 38.09 KG/M2 | DIASTOLIC BLOOD PRESSURE: 82 MMHG | HEART RATE: 68 BPM | SYSTOLIC BLOOD PRESSURE: 130 MMHG | WEIGHT: 215 LBS | HEIGHT: 63 IN

## 2021-05-20 ENCOUNTER — CONVERSION ENCOUNTER (OUTPATIENT)
Dept: CARDIOLOGY | Facility: CLINIC | Age: 51
End: 2021-05-20
Attending: INTERNAL MEDICINE

## 2021-05-20 ENCOUNTER — CONVERSION ENCOUNTER (OUTPATIENT)
Dept: OTHER | Facility: HOSPITAL | Age: 51
End: 2021-05-20

## 2021-06-05 NOTE — PROCEDURES
Procedure Note      Patient Name: Livia Zaman   Patient ID: [id number]   Sex: Female   YOB: 1970    Primary Care Provider: Zander Valenzuela PA-C   Referring Provider: Zander Valenzuela PA-C    Visit Date: May 20, 2021    Provider: Marty Muñoz MD   Location: [facility]   Location Address: [address] [place], [place] [address]  [id number]   Location Phone: [phone]          FINAL REPORT   HOLTER MONITOR REPORT  Date: 05/20/2021   Indication: PVCs      Interpretation Date:  05/26/2021    24-HOUR HOLTER MONITOR    FINDINGS:   The predominant rhythm was sinus rhythm to sinus tachycardia. The maximum heart rate was 132 beats per minute. The minimum heart rate was 61 beats per minute. Average heart rate was 88 beats per minute. There were 1797 PVCs with a burden of 1.41%. There were 26 PACs with a burden of 0.02%. Diary events were not entered. There were no patient-triggered events.    CONCLUSION:  1.  Baseline normal sinus rhythm with average heart rate of 88 beats per minute.  2.  Low frequency PVCs totaling 1.41% of all beats.   3.  Occasional  PACs totaling 26 beats or 0.02% of all beats.   4.  No patient-triggered events.      Marty Muñoz MD  /tomer                    Electronically Signed by: Caitlyn Galvan-, Other -Author on May 26, 2021 02:35:44 PM  Electronically Co-signed by: Marty Muñoz MD -Reviewer on May 28, 2021 02:59:59 PM

## 2021-06-05 NOTE — PROGRESS NOTES
"   Progress Note      Patient Name: Livia Zaman   Patient ID: [id number]   Sex: Female   YOB: 1970    Primary Care Provider: Zander Valenzuela PA-C   Referring Provider: Zander Valenzuela PA-C    Visit Date: May 20, 2021    Provider: Marty Muñoz MD   Location: [facility]   Location Address: [address] [place] [address], [address]   [place]  [id number]   Location Phone: [phone]          History Of Present Illness  REFERRING CARE PROVIDER: Zander Valenzuela PA-C   Livia Zaman is a 50 year old /White female with previous PVCs and obstructive sleep apnea, compliant with her CPAP, who has had increasing palpitations in the last few weeks, non-exertional in nature. She reports stable shortness of breath.   PAST MEDICAL HISTORY: Hypothyroidism; Neuroendocrine tumor; PVCs; Obstructive sleep apnea.   PSYCHOSOCIAL HISTORY: Rarely consumes alcohol. Denies tobacco use.   CURRENT MEDICATIONS: Medication list was reviewed and is as documented.      ALLERGIES:  Pristiq.       Review of Systems  · Cardiovascular  o Admits  o : palpitations (fast, fluttering, or skipping beats), shortness of breath while walking or lying flat  o Denies  o : swelling (feet, ankles, hands), chest pain or angina pectoris   · Respiratory  o Denies  o : chronic or frequent cough      Vitals  Date Time BP Position Site L\R Cuff Size HR RR TEMP (F) WT  HT  BMI kg/m2 BSA m2 O2 Sat FR L/min FiO2 HC       05/20/2021 08:32 /72 Sitting    80 - R   216lbs 0oz 5'  2\" 39.51 2.07             Physical Examination  · Constitutional  o Appearance  o : Awake, alert, in no acute distress.   · Eyes  o Conjunctivae  o : Normal.  · Ears, Nose, Mouth and Throat  o Oral Cavity  o :   § Oral Mucosa  § : Normal.  · Neck  o Inspection/Palpation  o : No JVD. Good carotid upstroke. No thyromegaly.  · Respiratory  o Respiratory  o : Good respiratory effort. Clear to percussion and auscultation.  · Cardiovascular  o Heart  o :   § Auscultation of " Heart  § : S1, S2 normal. Regular rate and rhythm without murmurs, gallops, or rubs.  o Peripheral Vascular System  o :   § Extremities  § : Good femoral and pedal pulses. No pedal edema.  · Gastrointestinal  o Abdominal Examination  o : Soft. No tenderness or masses felt. No hepatosplenomegaly. Abdominal aorta is not palpable.  · EKG  o EKG  o : Performed in the office today.  o Indications  o : PVCs.  o Results  o : Normal sinus rhythm.  o Comparison  o : No change from prior.           Assessment     ASSESSMENT AND PLAN:  PVCs, now with increasing palpitations most likely the underlying etiology.  Recommend repeating a trial of Toprol 25 mg daily.  Will also repeat at 24-hour Holter monitor to reassess if any change in frequency.    Marty Muñoz MD  JH/lm               Electronically Signed by: Jasmina Martinez-, Other -Author on May 22, 2021 09:48:14 AM  Electronically Co-signed by: Marty Muñoz MD -Reviewer on May 24, 2021 03:53:27 PM

## 2021-06-29 ENCOUNTER — TRANSCRIBE ORDERS (OUTPATIENT)
Dept: ADMINISTRATIVE | Facility: HOSPITAL | Age: 51
End: 2021-06-29

## 2021-06-29 DIAGNOSIS — Z12.31 ENCOUNTER FOR SCREENING MAMMOGRAM FOR BREAST CANCER: Primary | ICD-10-CM

## 2021-07-15 VITALS
HEIGHT: 62 IN | BODY MASS INDEX: 39.75 KG/M2 | SYSTOLIC BLOOD PRESSURE: 114 MMHG | WEIGHT: 216 LBS | HEART RATE: 80 BPM | DIASTOLIC BLOOD PRESSURE: 72 MMHG

## 2021-08-13 ENCOUNTER — HOSPITAL ENCOUNTER (OUTPATIENT)
Dept: MAMMOGRAPHY | Facility: HOSPITAL | Age: 51
Discharge: HOME | End: 2021-08-13
Admitting: OBSTETRICS & GYNECOLOGY

## 2021-08-13 DIAGNOSIS — Z12.31 ENCOUNTER FOR SCREENING MAMMOGRAM FOR BREAST CANCER: ICD-10-CM

## 2021-08-13 PROCEDURE — 77067 SCR MAMMO BI INCL CAD: CPT

## 2021-08-13 PROCEDURE — 77063 BREAST TOMOSYNTHESIS BI: CPT

## 2021-09-20 ENCOUNTER — OFFICE VISIT (OUTPATIENT)
Dept: FAMILY MEDICINE CLINIC | Facility: CLINIC | Age: 51
End: 2021-09-20

## 2021-09-20 ENCOUNTER — LAB (OUTPATIENT)
Dept: LAB | Facility: HOSPITAL | Age: 51
End: 2021-09-20

## 2021-09-20 VITALS — WEIGHT: 213.4 LBS | BODY MASS INDEX: 37.81 KG/M2 | HEIGHT: 63 IN

## 2021-09-20 DIAGNOSIS — Z11.59 ENCOUNTER FOR HEPATITIS C SCREENING TEST FOR LOW RISK PATIENT: ICD-10-CM

## 2021-09-20 DIAGNOSIS — E66.01 CLASS 2 SEVERE OBESITY DUE TO EXCESS CALORIES WITH SERIOUS COMORBIDITY AND BODY MASS INDEX (BMI) OF 38.0 TO 38.9 IN ADULT (HCC): Chronic | ICD-10-CM

## 2021-09-20 DIAGNOSIS — R73.01 IMPAIRED FASTING GLUCOSE: ICD-10-CM

## 2021-09-20 DIAGNOSIS — F41.9 ANXIETY: ICD-10-CM

## 2021-09-20 DIAGNOSIS — F41.9 ANXIETY: Chronic | ICD-10-CM

## 2021-09-20 DIAGNOSIS — Z12.11 SCREEN FOR COLON CANCER: ICD-10-CM

## 2021-09-20 DIAGNOSIS — E03.9 HYPOTHYROIDISM, UNSPECIFIED TYPE: Chronic | ICD-10-CM

## 2021-09-20 DIAGNOSIS — E03.9 HYPOTHYROIDISM, UNSPECIFIED TYPE: Primary | Chronic | ICD-10-CM

## 2021-09-20 DIAGNOSIS — E66.01 CLASS 2 SEVERE OBESITY DUE TO EXCESS CALORIES WITH SERIOUS COMORBIDITY AND BODY MASS INDEX (BMI) OF 38.0 TO 38.9 IN ADULT (HCC): ICD-10-CM

## 2021-09-20 PROBLEM — E66.812 CLASS 2 SEVERE OBESITY DUE TO EXCESS CALORIES WITH SERIOUS COMORBIDITY AND BODY MASS INDEX (BMI) OF 38.0 TO 38.9 IN ADULT: Status: ACTIVE | Noted: 2021-09-20

## 2021-09-20 LAB
25(OH)D3 SERPL-MCNC: 41.7 NG/ML (ref 30–100)
ALBUMIN SERPL-MCNC: 4.4 G/DL (ref 3.5–5.2)
ALBUMIN/GLOB SERPL: 1.5 G/DL
ALP SERPL-CCNC: 102 U/L (ref 39–117)
ALT SERPL W P-5'-P-CCNC: 16 U/L (ref 1–33)
ANION GAP SERPL CALCULATED.3IONS-SCNC: 11.1 MMOL/L (ref 5–15)
AST SERPL-CCNC: 16 U/L (ref 1–32)
BASOPHILS # BLD AUTO: 0.07 10*3/MM3 (ref 0–0.2)
BASOPHILS NFR BLD AUTO: 0.8 % (ref 0–1.5)
BILIRUB SERPL-MCNC: 0.3 MG/DL (ref 0–1.2)
BILIRUB UR QL STRIP: NEGATIVE
BUN SERPL-MCNC: 8 MG/DL (ref 6–20)
BUN/CREAT SERPL: 12.1 (ref 7–25)
CALCIUM SPEC-SCNC: 9.1 MG/DL (ref 8.6–10.5)
CHLORIDE SERPL-SCNC: 99 MMOL/L (ref 98–107)
CHOLEST SERPL-MCNC: 166 MG/DL (ref 0–200)
CLARITY UR: CLEAR
CO2 SERPL-SCNC: 25.9 MMOL/L (ref 22–29)
COLOR UR: YELLOW
CREAT SERPL-MCNC: 0.66 MG/DL (ref 0.57–1)
DEPRECATED RDW RBC AUTO: 37.8 FL (ref 37–54)
EOSINOPHIL # BLD AUTO: 0.21 10*3/MM3 (ref 0–0.4)
EOSINOPHIL NFR BLD AUTO: 2.3 % (ref 0.3–6.2)
ERYTHROCYTE [DISTWIDTH] IN BLOOD BY AUTOMATED COUNT: 12.1 % (ref 12.3–15.4)
GFR SERPL CREATININE-BSD FRML MDRD: 95 ML/MIN/1.73
GLOBULIN UR ELPH-MCNC: 2.9 GM/DL
GLUCOSE SERPL-MCNC: 89 MG/DL (ref 65–99)
GLUCOSE UR STRIP-MCNC: NEGATIVE MG/DL
HBA1C MFR BLD: 5.54 % (ref 4.8–5.6)
HCT VFR BLD AUTO: 39.7 % (ref 34–46.6)
HCV AB SER DONR QL: NORMAL
HDLC SERPL-MCNC: 40 MG/DL (ref 40–60)
HGB BLD-MCNC: 13.4 G/DL (ref 12–15.9)
HGB UR QL STRIP.AUTO: NEGATIVE
IMM GRANULOCYTES # BLD AUTO: 0.03 10*3/MM3 (ref 0–0.05)
IMM GRANULOCYTES NFR BLD AUTO: 0.3 % (ref 0–0.5)
KETONES UR QL STRIP: NEGATIVE
LDLC SERPL CALC-MCNC: 104 MG/DL (ref 0–100)
LDLC/HDLC SERPL: 2.53 {RATIO}
LEUKOCYTE ESTERASE UR QL STRIP.AUTO: NEGATIVE
LYMPHOCYTES # BLD AUTO: 1.81 10*3/MM3 (ref 0.7–3.1)
LYMPHOCYTES NFR BLD AUTO: 19.8 % (ref 19.6–45.3)
MCH RBC QN AUTO: 28.9 PG (ref 26.6–33)
MCHC RBC AUTO-ENTMCNC: 33.8 G/DL (ref 31.5–35.7)
MCV RBC AUTO: 85.7 FL (ref 79–97)
MONOCYTES # BLD AUTO: 0.42 10*3/MM3 (ref 0.1–0.9)
MONOCYTES NFR BLD AUTO: 4.6 % (ref 5–12)
NEUTROPHILS NFR BLD AUTO: 6.61 10*3/MM3 (ref 1.7–7)
NEUTROPHILS NFR BLD AUTO: 72.2 % (ref 42.7–76)
NITRITE UR QL STRIP: NEGATIVE
NRBC BLD AUTO-RTO: 0 /100 WBC (ref 0–0.2)
PH UR STRIP.AUTO: 5.5 [PH] (ref 5–8)
PLATELET # BLD AUTO: 322 10*3/MM3 (ref 140–450)
PMV BLD AUTO: 11.6 FL (ref 6–12)
POTASSIUM SERPL-SCNC: 4 MMOL/L (ref 3.5–5.2)
PROT SERPL-MCNC: 7.3 G/DL (ref 6–8.5)
PROT UR QL STRIP: NEGATIVE
RBC # BLD AUTO: 4.63 10*6/MM3 (ref 3.77–5.28)
SODIUM SERPL-SCNC: 136 MMOL/L (ref 136–145)
SP GR UR STRIP: 1.02 (ref 1–1.03)
TRIGL SERPL-MCNC: 124 MG/DL (ref 0–150)
TSH SERPL DL<=0.05 MIU/L-ACNC: 1.54 UIU/ML (ref 0.27–4.2)
UROBILINOGEN UR QL STRIP: NORMAL
VLDLC SERPL-MCNC: 22 MG/DL (ref 5–40)
WBC # BLD AUTO: 9.15 10*3/MM3 (ref 3.4–10.8)

## 2021-09-20 PROCEDURE — 80061 LIPID PANEL: CPT

## 2021-09-20 PROCEDURE — 86803 HEPATITIS C AB TEST: CPT

## 2021-09-20 PROCEDURE — 85025 COMPLETE CBC W/AUTO DIFF WBC: CPT

## 2021-09-20 PROCEDURE — 99214 OFFICE O/P EST MOD 30 MIN: CPT | Performed by: PHYSICIAN ASSISTANT

## 2021-09-20 PROCEDURE — 81003 URINALYSIS AUTO W/O SCOPE: CPT

## 2021-09-20 PROCEDURE — 83036 HEMOGLOBIN GLYCOSYLATED A1C: CPT

## 2021-09-20 PROCEDURE — 82306 VITAMIN D 25 HYDROXY: CPT

## 2021-09-20 PROCEDURE — 36415 COLL VENOUS BLD VENIPUNCTURE: CPT

## 2021-09-20 PROCEDURE — 84443 ASSAY THYROID STIM HORMONE: CPT

## 2021-09-20 PROCEDURE — 80053 COMPREHEN METABOLIC PANEL: CPT

## 2021-09-20 RX ORDER — SEMAGLUTIDE 0.5 MG/.5ML
2.4 INJECTION, SOLUTION SUBCUTANEOUS WEEKLY
COMMUNITY
Start: 2021-07-08 | End: 2022-01-03 | Stop reason: SDUPTHER

## 2021-09-20 RX ORDER — LEVOTHYROXINE SODIUM 0.12 MG/1
125 TABLET ORAL DAILY
Qty: 90 TABLET | Refills: 3 | Status: SHIPPED | OUTPATIENT
Start: 2021-09-20 | End: 2022-01-03 | Stop reason: SDUPTHER

## 2021-09-20 RX ORDER — METOPROLOL SUCCINATE 25 MG/1
25 TABLET, EXTENDED RELEASE ORAL DAILY
COMMUNITY
Start: 2021-08-30 | End: 2022-03-07 | Stop reason: SDUPTHER

## 2021-09-20 RX ORDER — ESCITALOPRAM OXALATE 20 MG/1
20 TABLET ORAL DAILY
COMMUNITY
Start: 2021-07-22

## 2021-09-20 RX ORDER — NORETHINDRONE ACETATE AND ETHINYL ESTRADIOL, AND FERROUS FUMARATE 1MG-20(24)
1 KIT ORAL DAILY
COMMUNITY
Start: 2021-09-05 | End: 2022-05-30

## 2021-09-20 RX ORDER — LORAZEPAM 1 MG/1
TABLET ORAL
COMMUNITY
Start: 2021-09-11

## 2021-09-20 RX ORDER — LEVOTHYROXINE SODIUM 0.12 MG/1
TABLET ORAL
COMMUNITY
Start: 2021-05-12 | End: 2021-09-20 | Stop reason: SDUPTHER

## 2021-09-20 NOTE — PROGRESS NOTES
"Chief Complaint  Hypothyroidism (6 month follow up), Hypertension, and Anxiety    Subjective          Livia Zaman presents to [facility]  History of Present Illness  Pt presents today for 6 month follow up.    Pt states at her last appt with  he started her on Wegovy. Pt states she is doing well on medication with no issues.    Pt states no issues or concerns to discuss.    Labs 12/22/20  Pap 2021      Current Outpatient Medications:   •  escitalopram (LEXAPRO) 20 MG tablet, , Disp: , Rfl:   •  Savannah 24 Fe 1-20 MG-MCG(24) per tablet, , Disp: , Rfl:   •  levothyroxine (SYNTHROID, LEVOTHROID) 125 MCG tablet, Take 1 tablet by mouth Daily., Disp: 90 tablet, Rfl: 3  •  LORazepam (ATIVAN) 1 MG tablet, TAKE 1/2 TABLET BY MOUTH TWICE DAILY AND TAKE 1 TABLET BY MOUTH AT BEDTIME AS NEEDED, Disp: , Rfl:   •  metoprolol succinate XL (TOPROL-XL) 25 MG 24 hr tablet, , Disp: , Rfl:   •  Wegovy 0.5 MG/0.5ML solution auto-injector, INJECT 0.5MG WEEKLY FOR 4 WEEKS, Disp: , Rfl:     Objective      Vital Signs:   Ht 158.8 cm (62.5\")   Wt 96.8 kg (213 lb 6.4 oz)   BMI 38.41 kg/m²    Estimated body mass index is 38.41 kg/m² as calculated from the following:    Height as of this encounter: 158.8 cm (62.5\").    Weight as of this encounter: 96.8 kg (213 lb 6.4 oz).     Physical Exam  Vitals and nursing note reviewed.   Constitutional:       Appearance: Normal appearance. She is obese.   HENT:      Head: Normocephalic and atraumatic.   Cardiovascular:      Rate and Rhythm: Normal rate and regular rhythm.   Pulmonary:      Effort: Pulmonary effort is normal.      Breath sounds: Normal breath sounds.   Musculoskeletal:      Cervical back: Neck supple.   Neurological:      Mental Status: She is alert.   Psychiatric:         Mood and Affect: Mood normal.         Behavior: Behavior normal.        Result Review :                   Assessment and Plan      Diagnoses and all orders for this " visit:    1. Hypothyroidism, unspecified type (Primary)  Comments:  synthroid 125mcg daily - stable  Overview:  Stable on synthroid 125mcg daily    Orders:  -     levothyroxine (SYNTHROID, LEVOTHROID) 125 MCG tablet; Take 1 tablet by mouth Daily.  Dispense: 90 tablet; Refill: 3  -     TSH Rfx On Abnormal To Free T4; Future  -     Urinalysis With Microscopic If Indicated (No Culture) - Urine, Clean Catch; Future    2. Screen for colon cancer  -     Cancel: Cologuard - Stool, Per Rectum; Future  -     Ambulatory Referral For Screening Colonoscopy    3. Anxiety  Comments:  Stable with PRN ativan 1mg  Orders:  -     CBC & Differential; Future  -     Comprehensive Metabolic Panel; Future  -     Vitamin D 25 Hydroxy; Future    4. Class 2 severe obesity due to excess calories with serious comorbidity and body mass index (BMI) of 38.0 to 38.9 in adult (CMS/[facility])  Comments:  Disc diet and exercise  Orders:  -     CBC & Differential; Future  -     Comprehensive Metabolic Panel; Future  -     Lipid Panel; Future  -     TSH Rfx On Abnormal To Free T4; Future    5. Encounter for hepatitis C screening test for low risk patient  -     Hepatitis C antibody; Future    6. Impaired fasting glucose  -     Comprehensive Metabolic Panel; Future  -     Urinalysis With Microscopic If Indicated (No Culture) - Urine, Clean Catch; Future  -     Hemoglobin A1c; Future     Follow Up     Return in about 6 months (around 3/20/2022).    I have reviewed information obtained and documented by others and I have confirmed the accuracy of this documented note.     Patient was given instructions and counseling regarding her condition or for health maintenance advice. Please see specific information pulled into the AVS if appropriate.     ALICE Kwok

## 2021-10-25 ENCOUNTER — OFFICE VISIT (OUTPATIENT)
Dept: SURGERY | Facility: CLINIC | Age: 51
End: 2021-10-25

## 2021-10-25 ENCOUNTER — PREP FOR SURGERY (OUTPATIENT)
Dept: OTHER | Facility: HOSPITAL | Age: 51
End: 2021-10-25

## 2021-10-25 VITALS — HEIGHT: 62 IN | HEART RATE: 87 BPM | BODY MASS INDEX: 38.31 KG/M2 | WEIGHT: 208.2 LBS

## 2021-10-25 DIAGNOSIS — Z12.11 SCREENING FOR MALIGNANT NEOPLASM OF COLON: Primary | ICD-10-CM

## 2021-10-25 PROCEDURE — S0285 CNSLT BEFORE SCREEN COLONOSC: HCPCS | Performed by: NURSE PRACTITIONER

## 2021-10-25 RX ORDER — SODIUM CHLORIDE 0.9 % (FLUSH) 0.9 %
10 SYRINGE (ML) INJECTION AS NEEDED
Status: CANCELLED | OUTPATIENT
Start: 2021-10-25

## 2021-10-25 RX ORDER — NORGESTREL AND ETHINYL ESTRADIOL 0.3-0.03MG
KIT ORAL
COMMUNITY
End: 2021-12-14

## 2021-10-25 RX ORDER — FLUTICASONE PROPIONATE 50 MCG
2 SPRAY, SUSPENSION (ML) NASAL DAILY
COMMUNITY
End: 2022-01-26

## 2021-10-25 RX ORDER — CETIRIZINE HYDROCHLORIDE 10 MG/1
TABLET ORAL
COMMUNITY
End: 2022-01-26

## 2021-10-25 RX ORDER — POLYETHYLENE GLYCOL 3350 17 G/17G
POWDER, FOR SOLUTION ORAL
Qty: 238 PACKET | Refills: 0 | Status: ON HOLD | OUTPATIENT
Start: 2021-10-25 | End: 2021-12-15

## 2021-10-25 RX ORDER — PROMETHAZINE HYDROCHLORIDE AND CODEINE PHOSPHATE 6.25; 1 MG/5ML; MG/5ML
SYRUP ORAL
COMMUNITY
End: 2021-12-14

## 2021-10-25 RX ORDER — SODIUM CHLORIDE 0.9 % (FLUSH) 0.9 %
3 SYRINGE (ML) INJECTION EVERY 12 HOURS SCHEDULED
Status: CANCELLED | OUTPATIENT
Start: 2021-10-25

## 2021-10-25 NOTE — PROGRESS NOTES
Chief Complaint: Colonoscopy (colon consult)    Subjective      Colonoscopy consultation         History of Present Illness  Livia Zaman is a 51 y.o. female presents to [facility] for colonoscopy consultation.    Patient presents today without complaints on referral from Chong Perez for colonoscopy consultation.    Patient denies any abdominal pain, change in bowel habit, or rectal bleeding.    Denies any family history of colorectal cancer.    Patient has a history of pancreatic mass and liver lesions.    No previous colonoscopy.      Objective     Past Medical History:   Diagnosis Date   • Allergic rhinitis    • Anxiety    • Hypothyroidism 2019   • Insomnia    • Pancreatic lesion 2019       Past Surgical History:   Procedure Laterality Date   •  SECTION     • CHOLECYSTECTOMY           Current Outpatient Medications:   •  cetirizine (ZyrTEC Allergy) 10 MG tablet, Zyrtec 10 mg oral tablet take 1 tablet (10 mg) by oral route once daily   Active, Disp: , Rfl:   •  escitalopram (LEXAPRO) 20 MG tablet, , Disp: , Rfl:   •  fluticasone (FLONASE) 50 MCG/ACT nasal spray, 2 sprays into the nostril(s) as directed by provider Daily., Disp: , Rfl:   •  Savannah 24 Fe 1-20 MG-MCG(24) per tablet, , Disp: , Rfl:   •  levothyroxine (SYNTHROID, LEVOTHROID) 125 MCG tablet, Take 1 tablet by mouth Daily., Disp: 90 tablet, Rfl: 3  •  LORazepam (ATIVAN) 1 MG tablet, TAKE 1/2 TABLET BY MOUTH TWICE DAILY AND TAKE 1 TABLET BY MOUTH AT BEDTIME AS NEEDED, Disp: , Rfl:   •  metoprolol succinate XL (TOPROL-XL) 25 MG 24 hr tablet, , Disp: , Rfl:   •  Wegovy 0.5 MG/0.5ML solution auto-injector, 2.4 mg., Disp: , Rfl:   •  Norethin-Eth Estrad-Fe Biphas (LO LOESTRIN FE) 1 MG-10 MCG / 10 MCG tablet, Take 1 tablet by mouth., Disp: , Rfl:   •  norgestrel-ethinyl estradiol (Low-Ogestrel) 0.3-30 MG-MCG per tablet, Low-Ogestrel (28) 0.3-30 mg-mcg oral tablet take 1 tablet by oral route once daily    "Active, Disp: , Rfl:   •  polyethylene glycol (MIRALAX) 17 g packet, Take as directed.  Instructions given in office.  Dispense: 238 g bottle, Disp: 238 packet, Rfl: 0  •  promethazine-codeine (PHENERGAN with CODEINE) 6.25-10 MG/5ML syrup, Take  by mouth., Disp: , Rfl:   •  tretinoin (RETIN-A) 0.025 % cream, Apply  topically to the appropriate area as directed Daily., Disp: , Rfl:     Allergies   Allergen Reactions   • Desvenlafaxine Rash   • Morphine Nausea And Vomiting and GI Intolerance        Family History   Problem Relation Age of Onset   • Hypertension Mother    • Heart disease Father    • Prostate cancer Father    • Skin cancer Father    • Hypertension Maternal Grandmother    • Heart disease Maternal Grandfather    • Hypertension Maternal Grandfather    • Prostate cancer Maternal Grandfather    • Heart disease Paternal Grandmother    • Heart disease Paternal Grandfather    • Hypertension Other         AUNT   • Skin cancer Other         UNCLE       Social History     Socioeconomic History   • Marital status:    Tobacco Use   • Smoking status: Never Smoker   • Smokeless tobacco: Never Used   Vaping Use   • Vaping Use: Never used   Substance and Sexual Activity   • Alcohol use: Never   • Drug use: Not Currently       Review of Systems   Constitutional: Negative for chills and fever.   Gastrointestinal: Negative for abdominal distention, abdominal pain, anal bleeding, blood in stool, constipation, diarrhea and rectal pain.        Vital Signs:   Pulse 87   Ht 157.5 cm (62\")   Wt 94.4 kg (208 lb 3.2 oz)   BMI 38.08 kg/m²      Physical Exam  Constitutional:       Appearance: Normal appearance.   HENT:      Head: Normocephalic.   Cardiovascular:      Rate and Rhythm: Normal rate.   Pulmonary:      Effort: Pulmonary effort is normal.   Abdominal:      General: Abdomen is flat.      Palpations: Abdomen is soft.   Skin:     General: Skin is warm and dry.   Neurological:      General: No focal deficit present. "      Mental Status: She is alert and oriented to person, place, and time.   Psychiatric:         Mood and Affect: Mood normal.         Thought Content: Thought content normal.          Result Review :              [x]  Laboratory  []  Radiology  []  Pathology  []  Microbiology  []  EKG/Telemetry   []  Cardiology/Vascular   [x]  Old records  Today I have reviewed Chong Perez's previous office note along with previous CBC and CMP.     Assessment and Plan    Diagnoses and all orders for this visit:    1. Screening for malignant neoplasm of colon (Primary)    Other orders  -     polyethylene glycol (MIRALAX) 17 g packet; Take as directed.  Instructions given in office.  Dispense: 238 g bottle  Dispense: 238 packet; Refill: 0        Follow Up   Return for Schedule colonoscopy with Dr. Mares on 12/15/2021 at [facility].     Hospital arrival time: 10:000am    Possible risk/complications, benefits, and alternatives to surgical or invasive procedure have been explained to patient and/or legal guardian. Patient has been evaluated and can tolerate anesthesia and/or sedation. Risks, benefits, and alternatives to anesthesia and sedation have been explained to patient and/or legal guardian.  Patient verbalizes understanding and is willing to proceed with the above plan.    Patient was given instructions and counseling regarding her condition or for health maintenance advice. Please see specific information pulled into the AVS if appropriate.

## 2021-12-14 NOTE — PRE-PROCEDURE INSTRUCTIONS
Called patient to discuss instructions for laxative and skin prep. Discussed clear liquid diet and pre-op medications. Patient aware she can take zyrtec, flonase, lexapro, ativan, and metoprolol. Patient stated understanding. No other issues or concerns noted currently per patient.   Urine pregs ordered.  Patient is vaccinated for covid-19.

## 2021-12-15 ENCOUNTER — ANESTHESIA EVENT (OUTPATIENT)
Dept: GASTROENTEROLOGY | Facility: HOSPITAL | Age: 51
End: 2021-12-15

## 2021-12-15 ENCOUNTER — HOSPITAL ENCOUNTER (OUTPATIENT)
Facility: HOSPITAL | Age: 51
Setting detail: HOSPITAL OUTPATIENT SURGERY
Discharge: HOME | End: 2021-12-15
Attending: SURGERY | Admitting: SURGERY

## 2021-12-15 ENCOUNTER — ANESTHESIA (OUTPATIENT)
Dept: GASTROENTEROLOGY | Facility: HOSPITAL | Age: 51
End: 2021-12-15

## 2021-12-15 VITALS
TEMPERATURE: 97.9 F | DIASTOLIC BLOOD PRESSURE: 77 MMHG | RESPIRATION RATE: 16 BRPM | SYSTOLIC BLOOD PRESSURE: 120 MMHG | HEART RATE: 75 BPM | WEIGHT: 203.71 LBS | BODY MASS INDEX: 37.49 KG/M2 | HEIGHT: 62 IN | OXYGEN SATURATION: 99 %

## 2021-12-15 DIAGNOSIS — Z12.11 SCREENING FOR MALIGNANT NEOPLASM OF COLON: ICD-10-CM

## 2021-12-15 LAB — B-HCG UR QL: NEGATIVE

## 2021-12-15 PROCEDURE — 81025 URINE PREGNANCY TEST: CPT | Performed by: ANESTHESIOLOGY

## 2021-12-15 PROCEDURE — C1889 IMPLANT/INSERT DEVICE, NOC: HCPCS | Performed by: SURGERY

## 2021-12-15 PROCEDURE — 88305 TISSUE EXAM BY PATHOLOGIST: CPT | Performed by: SURGERY

## 2021-12-15 PROCEDURE — 25010000002 PROPOFOL 10 MG/ML EMULSION: Performed by: NURSE ANESTHETIST, CERTIFIED REGISTERED

## 2021-12-15 DEVICE — DEV CLIP ENDO RESOLUTION360 CONTRL ROT 235CM: Type: IMPLANTABLE DEVICE | Site: COLON | Status: FUNCTIONAL

## 2021-12-15 RX ORDER — SODIUM CHLORIDE, SODIUM LACTATE, POTASSIUM CHLORIDE, CALCIUM CHLORIDE 600; 310; 30; 20 MG/100ML; MG/100ML; MG/100ML; MG/100ML
30 INJECTION, SOLUTION INTRAVENOUS CONTINUOUS
Status: DISCONTINUED | OUTPATIENT
Start: 2021-12-15 | End: 2021-12-15 | Stop reason: HOSPADM

## 2021-12-15 RX ORDER — LIDOCAINE HYDROCHLORIDE 20 MG/ML
INJECTION, SOLUTION INFILTRATION; PERINEURAL AS NEEDED
Status: DISCONTINUED | OUTPATIENT
Start: 2021-12-15 | End: 2021-12-15 | Stop reason: SURG

## 2021-12-15 RX ORDER — SODIUM CHLORIDE 0.9 % (FLUSH) 0.9 %
3 SYRINGE (ML) INJECTION EVERY 12 HOURS SCHEDULED
Status: DISCONTINUED | OUTPATIENT
Start: 2021-12-15 | End: 2021-12-15 | Stop reason: HOSPADM

## 2021-12-15 RX ORDER — SODIUM CHLORIDE 0.9 % (FLUSH) 0.9 %
10 SYRINGE (ML) INJECTION AS NEEDED
Status: DISCONTINUED | OUTPATIENT
Start: 2021-12-15 | End: 2021-12-15 | Stop reason: HOSPADM

## 2021-12-15 RX ADMIN — SODIUM CHLORIDE, POTASSIUM CHLORIDE, SODIUM LACTATE AND CALCIUM CHLORIDE 30 ML/HR: 600; 310; 30; 20 INJECTION, SOLUTION INTRAVENOUS at 11:00

## 2021-12-15 RX ADMIN — PROPOFOL 175 MCG/KG/MIN: 10 INJECTION, EMULSION INTRAVENOUS at 11:23

## 2021-12-15 RX ADMIN — LIDOCAINE HYDROCHLORIDE 100 MG: 20 INJECTION, SOLUTION INFILTRATION; PERINEURAL at 11:23

## 2021-12-15 NOTE — H&P
Chief Complaint: No chief complaint on file.    Subjective      Colonoscopy consultation         History of Present Illness  Livia Zaman is a 51 y.o. female presents to [facility] for colonoscopy consultation.    Patient presents today without complaints on referral from Chogn Perez for colonoscopy consultation.    Patient denies any abdominal pain, change in bowel habit, or rectal bleeding.    Denies any family history of colorectal cancer.    Patient has a history of pancreatic mass and liver lesions.    No previous colonoscopy.      Objective     Past Medical History:   Diagnosis Date   • Allergic rhinitis    • Anxiety    • Hypothyroidism 2019   • Insomnia    • Pancreatic lesion 2019   • PVC (premature ventricular contraction)        Past Surgical History:   Procedure Laterality Date   •  SECTION     • CHOLECYSTECTOMY     • LIPOMA EXCISION Right     Right shoulder   • LIVER BIOPSY      laproscopic         Current Facility-Administered Medications:   •  lactated ringers infusion, 30 mL/hr, Intravenous, Continuous, Carolina Haas MD  •  sodium chloride 0.9 % flush 10 mL, 10 mL, Intravenous, PRN, Naman  •  sodium chloride 0.9 % flush 3 mL, 3 mL, Intravenous, Q12H, Naman    Allergies   Allergen Reactions   • Desvenlafaxine Rash   • Morphine Nausea And Vomiting and GI Intolerance        Family History   Problem Relation Age of Onset   • Hypertension Mother    • Heart disease Father    • Prostate cancer Father    • Skin cancer Father    • Hypertension Maternal Grandmother    • Heart disease Maternal Grandfather    • Hypertension Maternal Grandfather    • Prostate cancer Maternal Grandfather    • Heart disease Paternal Grandmother    • Heart disease Paternal Grandfather    • Hypertension Other         AUNT   • Skin cancer Other         UNCLE   • Malig Hyperthermia Neg Hx        Social History     Socioeconomic History   • Marital status:  "   Tobacco Use   • Smoking status: Never Smoker   • Smokeless tobacco: Never Used   Vaping Use   • Vaping Use: Never used   Substance and Sexual Activity   • Alcohol use: Yes     Alcohol/week: 1.0 standard drink     Types: 1 Glasses of wine per week     Comment: rarely   • Drug use: Never   • Sexual activity: Defer       Review of Systems   Constitutional: Negative for chills and fever.   Gastrointestinal: Negative for abdominal distention, abdominal pain, anal bleeding, blood in stool, constipation, diarrhea and rectal pain.        Vital Signs:   /82 (BP Location: Right arm, Patient Position: Lying)   Pulse 92   Temp 97.5 °F (36.4 °C) (Temporal)   Resp 20   Ht 157.5 cm (62\")   Wt 92.4 kg (203 lb 11.3 oz)   SpO2 97%   BMI 37.26 kg/m²      Physical Exam  Constitutional:       Appearance: Normal appearance.   HENT:      Head: Normocephalic.   Cardiovascular:      Rate and Rhythm: Normal rate.   Pulmonary:      Effort: Pulmonary effort is normal.   Abdominal:      General: Abdomen is flat.      Palpations: Abdomen is soft.   Skin:     General: Skin is warm and dry.   Neurological:      General: No focal deficit present.      Mental Status: She is alert and oriented to person, place, and time.   Psychiatric:         Mood and Affect: Mood normal.         Thought Content: Thought content normal.          Result Review :              [x]  Laboratory  []  Radiology  []  Pathology  []  Microbiology  []  EKG/Telemetry   []  Cardiology/Vascular   [x]  Old records  Today I have reviewed Chong Perez's previous office note along with previous CBC and CMP.     Assessment   Screening for malignant neoplasm of colon    Plan  Colonoscopy    Risks and benefits discussed    Electronically signed by Umang Mares MD, 12/15/21, 10:44 AM EST.            "

## 2021-12-15 NOTE — ANESTHESIA PREPROCEDURE EVALUATION
Anesthesia Evaluation     Patient summary reviewed and Nursing notes reviewed   no history of anesthetic complications:  NPO Solid Status: > 8 hours  NPO Liquid Status: > 2 hours           Airway   Mallampati: II  TM distance: >3 FB  Neck ROM: full  No difficulty expected  Dental      Pulmonary - negative pulmonary ROS and normal exam    breath sounds clear to auscultation  Cardiovascular - normal exam  Exercise tolerance: good (4-7 METS)    Patient on routine beta blocker and Beta blocker not taken-may be given intraoperatively  Rhythm: regular    (+) dysrhythmias PVC,       Neuro/Psych  (+) psychiatric history Anxiety,     GI/Hepatic/Renal/Endo    (+) obesity,   thyroid problem hypothyroidism    Musculoskeletal (-) negative ROS    Abdominal    Substance History - negative use     OB/GYN negative ob/gyn ROS         Other - negative ROS                       Anesthesia Plan    ASA 3     general and MAC   (Patient understands anesthesia not responsible for dental damage.)  intravenous induction     Anesthetic plan, all risks, benefits, and alternatives have been provided, discussed and informed consent has been obtained with: patient.  Use of blood products discussed with patient .   Plan discussed with CRNA.

## 2021-12-15 NOTE — ANESTHESIA POSTPROCEDURE EVALUATION
Patient: Livia Zaman    Procedure Summary     Date: 12/15/21 Room / Location: [facility] ENDOSCOPY 1 / [facility] ENDOSCOPY    Anesthesia Start: 1122 Anesthesia Stop: 1145    Procedure: COLONOSCOPY  with hot snare polypectomy (N/A ) Diagnosis:       Screening for malignant neoplasm of colon      (Screening for malignant neoplasm of colon [Z12.11])    Surgeons: Umang Mares MD Provider: Carolina Haas MD    Anesthesia Type: general, MAC ASA Status: 3          Anesthesia Type: general, MAC    Vitals  Vitals Value Taken Time   BP 97/65 12/15/21 1155   Temp 36.2 °C (97.2 °F) 12/15/21 1150   Pulse 77 12/15/21 1155   Resp 16 12/15/21 1150   SpO2 96 % 12/15/21 1155   Vitals shown include unvalidated device data.        Post Anesthesia Care and Evaluation    Patient location during evaluation: bedside  Patient participation: complete - patient participated  Level of consciousness: awake  Pain score: 0  Pain management: adequate  Airway patency: patent  Anesthetic complications: No anesthetic complications  PONV Status: none  Cardiovascular status: acceptable and stable  Respiratory status: acceptable and room air  Hydration status: acceptable    Comments: An Anesthesiologist personally participated in the most demanding procedures (including induction and emergence if applicable) in the anesthesia plan, monitored the course of anesthesia administration at frequent intervals and remained physically present and available for immediate diagnosis and treatment of emergencies.

## 2021-12-15 NOTE — DISCHARGE INSTRUCTIONS
Dr. Mares's Instructions       • Next colonoscopy in 3 years.    • Follow-up with your primary care clinician at your next scheduled appointment time.    • Drink at least 80 ounces of water daily.    • Maintain a high fiber diet aiming for at least 25 grams of fiber daily.    • See education information provided about diverticulosis.

## 2021-12-16 LAB
CYTO UR: NORMAL
LAB AP CASE REPORT: NORMAL
LAB AP CLINICAL INFORMATION: NORMAL
PATH REPORT.FINAL DX SPEC: NORMAL
PATH REPORT.GROSS SPEC: NORMAL

## 2022-01-03 ENCOUNTER — TELEPHONE (OUTPATIENT)
Dept: FAMILY MEDICINE CLINIC | Facility: CLINIC | Age: 52
End: 2022-01-03

## 2022-01-03 DIAGNOSIS — E66.01 CLASS 2 SEVERE OBESITY DUE TO EXCESS CALORIES WITH SERIOUS COMORBIDITY AND BODY MASS INDEX (BMI) OF 38.0 TO 38.9 IN ADULT: Primary | ICD-10-CM

## 2022-01-03 DIAGNOSIS — E03.9 HYPOTHYROIDISM, UNSPECIFIED TYPE: Chronic | ICD-10-CM

## 2022-01-03 RX ORDER — SEMAGLUTIDE 0.5 MG/.5ML
2.4 INJECTION, SOLUTION SUBCUTANEOUS WEEKLY
Qty: 0.5 ML | Refills: 3 | Status: SHIPPED | OUTPATIENT
Start: 2022-01-03 | End: 2022-01-05 | Stop reason: SDUPTHER

## 2022-01-03 RX ORDER — LEVOTHYROXINE SODIUM 0.12 MG/1
125 TABLET ORAL DAILY
Qty: 90 TABLET | Refills: 3 | Status: SHIPPED | OUTPATIENT
Start: 2022-01-03 | End: 2022-12-19 | Stop reason: SDUPTHER

## 2022-01-03 NOTE — TELEPHONE ENCOUNTER
----- Message from Livia Zaman sent at 1/3/2022  2:52 PM EST -----  Regarding: New Insurance, needing new prescriptions  Hello.  My insurance has changed and is on file.  I need new prescriptions sent to [facility].  I will need my Levothyroxin sent there.    Chong Valenzuela also told me to contact him when my Wegovy discount card is up.  He was not my original prescriber, but agreed to continue following me.  It is up.  My new insurance does pay for Wegovy 2.4mg, as well as other Weight loss meds through [facility] with preauthorization.  I know that Wegovy is really hard to get right now, so he may want to try an alternative.  My current weight is 202 lbs.  Started a year ago at 239.  Please let me know what we need to do.  My local pharmacy now is [facility].  I can go through either pharmacy if he wants me to try something different.

## 2022-01-05 DIAGNOSIS — E66.01 CLASS 2 SEVERE OBESITY DUE TO EXCESS CALORIES WITH SERIOUS COMORBIDITY AND BODY MASS INDEX (BMI) OF 38.0 TO 38.9 IN ADULT: ICD-10-CM

## 2022-01-05 RX ORDER — SEMAGLUTIDE 0.5 MG/.5ML
2.4 INJECTION, SOLUTION SUBCUTANEOUS WEEKLY
Qty: 2 ML | Refills: 3 | Status: SHIPPED | OUTPATIENT
Start: 2022-01-05 | End: 2022-01-26

## 2022-01-13 ENCOUNTER — TELEPHONE (OUTPATIENT)
Dept: FAMILY MEDICINE CLINIC | Facility: CLINIC | Age: 52
End: 2022-01-13

## 2022-01-13 DIAGNOSIS — E66.01 CLASS 2 SEVERE OBESITY DUE TO EXCESS CALORIES WITH SERIOUS COMORBIDITY AND BODY MASS INDEX (BMI) OF 38.0 TO 38.9 IN ADULT: Primary | ICD-10-CM

## 2022-01-23 PROBLEM — I49.3 PVC (PREMATURE VENTRICULAR CONTRACTION): Status: ACTIVE | Noted: 2022-01-23

## 2022-01-24 NOTE — PROGRESS NOTES
Chief Complaint  pvc    Subjective    Patient with symptomatically stable intermittent heart palpitations no other complaints today    Past Medical History:   Diagnosis Date   • Allergic rhinitis    • Anxiety    • Hypothyroidism 03/08/2019   • Insomnia    • Pancreatic lesion 09/05/2019   • PVC (premature ventricular contraction)          Current Outpatient Medications:   •  escitalopram (LEXAPRO) 20 MG tablet, Take 20 mg by mouth Daily., Disp: , Rfl:   •  Savannah 24 Fe 1-20 MG-MCG(24) per tablet, Take 1 tablet by mouth Daily., Disp: , Rfl:   •  levothyroxine (SYNTHROID, LEVOTHROID) 125 MCG tablet, Take 1 tablet by mouth Daily., Disp: 90 tablet, Rfl: 3  •  LORazepam (ATIVAN) 1 MG tablet, TAKE 1/2 TABLET BY MOUTH TWICE DAILY AND TAKE 1 TABLET BY MOUTH AT BEDTIME AS NEEDED, Disp: , Rfl:   •  metoprolol succinate XL (TOPROL-XL) 25 MG 24 hr tablet, Take 25 mg by mouth Daily., Disp: , Rfl:   •  Semaglutide-Weight Management 2.4 MG/0.75ML solution auto-injector, Inject 2.4 mg under the skin into the appropriate area as directed 1 (One) Time Per Week., Disp: 1 mL, Rfl: 3    Medications Discontinued During This Encounter   Medication Reason   • cetirizine (ZyrTEC Allergy) 10 MG tablet *Therapy completed   • fluticasone (FLONASE) 50 MCG/ACT nasal spray *Therapy completed   • tretinoin (RETIN-A) 0.025 % cream *Therapy completed   • Wegovy 0.5 MG/0.5ML solution auto-injector *Therapy completed     Allergies   Allergen Reactions   • Desvenlafaxine Rash   • Morphine Nausea And Vomiting and GI Intolerance        Social History     Tobacco Use   • Smoking status: Never Smoker   • Smokeless tobacco: Never Used   Vaping Use   • Vaping Use: Never used   Substance Use Topics   • Alcohol use: Yes     Alcohol/week: 1.0 standard drink     Types: 1 Glasses of wine per week     Comment: rarely   • Drug use: Never       Family History   Problem Relation Age of Onset   • Hypertension Mother    • Kidney disease Mother    • Heart disease Father   "  • Prostate cancer Father    • Skin cancer Father    • Hypertension Maternal Grandmother    • Heart disease Maternal Grandfather    • Hypertension Maternal Grandfather    • Prostate cancer Maternal Grandfather    • Heart disease Paternal Grandmother    • Heart disease Paternal Grandfather    • Hypertension Other         AUNT   • Skin cancer Other         UNCLE   • Malig Hyperthermia Neg Hx         Objective     /63   Pulse 76   Ht 157.5 cm (62\")   Wt 93 kg (205 lb)   BMI 37.49 kg/m²       Physical Exam    General Appearance:   · no acute distress  · Alert and oriented x3  HENT:   · lips not cyanotic  · Atraumatic  Neck:  · No jvd   · supple  Respiratory:  · no respiratory distress  · normal breath sounds  · no rales  Cardiovascular:  · Regular rate and rhythm  · no S3, no S4   · no murmur  · no rub  Extremities  · No cyanosis  · lower extremity edema: none    Skin:   · warm, dry  · No rashes      Result Review :     No results found for: PROBNP  CMP    CMP 9/20/21   Glucose 89   BUN 8   Creatinine 0.66   eGFR Non African Am 95   Sodium 136   Potassium 4.0   Chloride 99   Calcium 9.1   Albumin 4.40   Total Bilirubin 0.3   Alkaline Phosphatase 102   AST (SGOT) 16   ALT (SGPT) 16           CBC w/diff    CBC w/Diff 9/20/21   WBC 9.15   RBC 4.63   Hemoglobin 13.4   Hematocrit 39.7   MCV 85.7   MCH 28.9   MCHC 33.8   RDW 12.1 (A)   Platelets 322   Neutrophil Rel % 72.2   Immature Granulocyte Rel % 0.3   Lymphocyte Rel % 19.8   Monocyte Rel % 4.6 (A)   Eosinophil Rel % 2.3   Basophil Rel % 0.8   (A) Abnormal value             Lab Results   Component Value Date    TSH 1.540 09/20/2021      Lab Results   Component Value Date    FREET4 1.0 12/17/2020      No results found for: DDIMERQUANT  Magnesium   Date Value Ref Range Status   12/22/2020 1.91 1.60 - 2.30 mg/dL Final      No results found for: DIGOXIN   No results found for: TROPONINT        Lipid Panel    Lipid Panel 9/20/21   Total Cholesterol 166 "   Triglycerides 124   HDL Cholesterol 40   VLDL Cholesterol 22   LDL Cholesterol  104 (A)   LDL/HDL Ratio 2.53   (A) Abnormal value            No results found for: POCTROP                   Diagnoses and all orders for this visit:    1. PVC (premature ventricular contraction) (Primary)  Assessment & Plan:  Continue on Toprol 25 mg per symptom treatment in addition given her neuroendocrine tumor we will repeat echocardiogram to evaluate for any valvular abnormalities or involvement      2. Palpitations  -     Adult Transthoracic Echo Complete W/ Cont if Necessary Per Protocol; Future          Follow Up     Return in about 1 year (around 1/26/2023).          Patient was given instructions and counseling regarding her condition or for health maintenance advice. Please see specific information pulled into the AVS if appropriate.

## 2022-01-26 ENCOUNTER — OFFICE VISIT (OUTPATIENT)
Dept: CARDIOLOGY | Facility: CLINIC | Age: 52
End: 2022-01-26

## 2022-01-26 VITALS
BODY MASS INDEX: 37.73 KG/M2 | HEIGHT: 62 IN | WEIGHT: 205 LBS | HEART RATE: 76 BPM | DIASTOLIC BLOOD PRESSURE: 63 MMHG | SYSTOLIC BLOOD PRESSURE: 110 MMHG

## 2022-01-26 DIAGNOSIS — I49.3 PVC (PREMATURE VENTRICULAR CONTRACTION): Primary | ICD-10-CM

## 2022-01-26 DIAGNOSIS — R00.2 PALPITATIONS: ICD-10-CM

## 2022-01-26 PROBLEM — D3A.8 PRIMARY PANCREATIC NEUROENDOCRINE TUMOR: Status: ACTIVE | Noted: 2018-06-26

## 2022-01-26 PROCEDURE — 99213 OFFICE O/P EST LOW 20 MIN: CPT | Performed by: INTERNAL MEDICINE

## 2022-01-26 NOTE — ASSESSMENT & PLAN NOTE
Continue on Toprol 25 mg per symptom treatment in addition given her neuroendocrine tumor we will repeat echocardiogram to evaluate for any valvular abnormalities or involvement

## 2022-01-29 ENCOUNTER — LAB (OUTPATIENT)
Dept: LAB | Facility: HOSPITAL | Age: 52
End: 2022-01-29

## 2022-01-29 ENCOUNTER — TRANSCRIBE ORDERS (OUTPATIENT)
Dept: ADMINISTRATIVE | Facility: HOSPITAL | Age: 52
End: 2022-01-29

## 2022-01-29 DIAGNOSIS — D3A.8 NEUROENDOCRINE TUMOR: Primary | ICD-10-CM

## 2022-01-29 DIAGNOSIS — D3A.8 NEUROENDOCRINE TUMOR: ICD-10-CM

## 2022-01-29 LAB
ALBUMIN SERPL-MCNC: 3.7 G/DL (ref 3.5–5.2)
ALBUMIN/GLOB SERPL: 1.3 G/DL
ALP SERPL-CCNC: 83 U/L (ref 39–117)
ALT SERPL W P-5'-P-CCNC: 11 U/L (ref 1–33)
ANION GAP SERPL CALCULATED.3IONS-SCNC: 8.7 MMOL/L (ref 5–15)
AST SERPL-CCNC: 13 U/L (ref 1–32)
BASOPHILS # BLD AUTO: 0.08 10*3/MM3 (ref 0–0.2)
BASOPHILS NFR BLD AUTO: 1 % (ref 0–1.5)
BILIRUB SERPL-MCNC: 0.2 MG/DL (ref 0–1.2)
BUN SERPL-MCNC: 13 MG/DL (ref 6–20)
BUN/CREAT SERPL: 18.1 (ref 7–25)
CALCIUM SPEC-SCNC: 8.6 MG/DL (ref 8.6–10.5)
CHLORIDE SERPL-SCNC: 104 MMOL/L (ref 98–107)
CO2 SERPL-SCNC: 24.3 MMOL/L (ref 22–29)
CREAT SERPL-MCNC: 0.72 MG/DL (ref 0.57–1)
DEPRECATED RDW RBC AUTO: 38.6 FL (ref 37–54)
EOSINOPHIL # BLD AUTO: 0.17 10*3/MM3 (ref 0–0.4)
EOSINOPHIL NFR BLD AUTO: 2.1 % (ref 0.3–6.2)
ERYTHROCYTE [DISTWIDTH] IN BLOOD BY AUTOMATED COUNT: 12.2 % (ref 12.3–15.4)
GFR SERPL CREATININE-BSD FRML MDRD: 86 ML/MIN/1.73
GLOBULIN UR ELPH-MCNC: 2.9 GM/DL
GLUCOSE SERPL-MCNC: 93 MG/DL (ref 65–99)
HCT VFR BLD AUTO: 40.1 % (ref 34–46.6)
HGB BLD-MCNC: 13.6 G/DL (ref 12–15.9)
IMM GRANULOCYTES # BLD AUTO: 0.02 10*3/MM3 (ref 0–0.05)
IMM GRANULOCYTES NFR BLD AUTO: 0.2 % (ref 0–0.5)
LYMPHOCYTES # BLD AUTO: 1.99 10*3/MM3 (ref 0.7–3.1)
LYMPHOCYTES NFR BLD AUTO: 24.7 % (ref 19.6–45.3)
MCH RBC QN AUTO: 29.4 PG (ref 26.6–33)
MCHC RBC AUTO-ENTMCNC: 33.9 G/DL (ref 31.5–35.7)
MCV RBC AUTO: 86.6 FL (ref 79–97)
MONOCYTES # BLD AUTO: 0.39 10*3/MM3 (ref 0.1–0.9)
MONOCYTES NFR BLD AUTO: 4.8 % (ref 5–12)
NEUTROPHILS NFR BLD AUTO: 5.41 10*3/MM3 (ref 1.7–7)
NEUTROPHILS NFR BLD AUTO: 67.2 % (ref 42.7–76)
NRBC BLD AUTO-RTO: 0 /100 WBC (ref 0–0.2)
PLATELET # BLD AUTO: 345 10*3/MM3 (ref 140–450)
PMV BLD AUTO: 11.3 FL (ref 6–12)
POTASSIUM SERPL-SCNC: 4.2 MMOL/L (ref 3.5–5.2)
PROT SERPL-MCNC: 6.6 G/DL (ref 6–8.5)
RBC # BLD AUTO: 4.63 10*6/MM3 (ref 3.77–5.28)
SODIUM SERPL-SCNC: 137 MMOL/L (ref 136–145)
WBC NRBC COR # BLD: 8.06 10*3/MM3 (ref 3.4–10.8)

## 2022-01-29 PROCEDURE — 36415 COLL VENOUS BLD VENIPUNCTURE: CPT

## 2022-01-29 PROCEDURE — 85025 COMPLETE CBC W/AUTO DIFF WBC: CPT

## 2022-01-29 PROCEDURE — 80053 COMPREHEN METABOLIC PANEL: CPT

## 2022-01-29 PROCEDURE — 86316 IMMUNOASSAY TUMOR OTHER: CPT

## 2022-02-04 LAB — CGA SERPL-MCNC: 73.3 NG/ML (ref 0–101.8)

## 2022-02-25 ENCOUNTER — PATIENT MESSAGE (OUTPATIENT)
Dept: CARDIOLOGY | Facility: CLINIC | Age: 52
End: 2022-02-25

## 2022-03-07 RX ORDER — METOPROLOL SUCCINATE 25 MG/1
25 TABLET, EXTENDED RELEASE ORAL DAILY
Qty: 90 TABLET | Refills: 3 | Status: SHIPPED | OUTPATIENT
Start: 2022-03-07 | End: 2023-02-01 | Stop reason: SDUPTHER

## 2022-03-21 ENCOUNTER — OFFICE VISIT (OUTPATIENT)
Dept: FAMILY MEDICINE CLINIC | Facility: CLINIC | Age: 52
End: 2022-03-21

## 2022-03-21 ENCOUNTER — LAB (OUTPATIENT)
Dept: LAB | Facility: HOSPITAL | Age: 52
End: 2022-03-21

## 2022-03-21 VITALS
WEIGHT: 202 LBS | SYSTOLIC BLOOD PRESSURE: 103 MMHG | HEIGHT: 62 IN | OXYGEN SATURATION: 99 % | DIASTOLIC BLOOD PRESSURE: 62 MMHG | HEART RATE: 78 BPM | BODY MASS INDEX: 37.17 KG/M2

## 2022-03-21 DIAGNOSIS — D3A.8 PRIMARY PANCREATIC NEUROENDOCRINE TUMOR: ICD-10-CM

## 2022-03-21 DIAGNOSIS — Z13.220 SCREENING FOR LIPID DISORDERS: ICD-10-CM

## 2022-03-21 DIAGNOSIS — E03.9 HYPOTHYROIDISM, UNSPECIFIED TYPE: ICD-10-CM

## 2022-03-21 DIAGNOSIS — E55.9 VITAMIN D DEFICIENCY: ICD-10-CM

## 2022-03-21 DIAGNOSIS — E03.9 HYPOTHYROIDISM, UNSPECIFIED TYPE: Primary | ICD-10-CM

## 2022-03-21 DIAGNOSIS — E66.01 CLASS 2 SEVERE OBESITY DUE TO EXCESS CALORIES WITH SERIOUS COMORBIDITY AND BODY MASS INDEX (BMI) OF 36.0 TO 36.9 IN ADULT: Chronic | ICD-10-CM

## 2022-03-21 DIAGNOSIS — E66.01 CLASS 2 SEVERE OBESITY DUE TO EXCESS CALORIES WITH SERIOUS COMORBIDITY AND BODY MASS INDEX (BMI) OF 38.0 TO 38.9 IN ADULT: ICD-10-CM

## 2022-03-21 LAB
25(OH)D3 SERPL-MCNC: 28.3 NG/ML (ref 30–100)
ALBUMIN SERPL-MCNC: 4 G/DL (ref 3.5–5.2)
ALBUMIN/GLOB SERPL: 1.3 G/DL
ALP SERPL-CCNC: 81 U/L (ref 39–117)
ALT SERPL W P-5'-P-CCNC: 23 U/L (ref 1–33)
ANION GAP SERPL CALCULATED.3IONS-SCNC: 11.2 MMOL/L (ref 5–15)
AST SERPL-CCNC: 16 U/L (ref 1–32)
BASOPHILS # BLD AUTO: 0.07 10*3/MM3 (ref 0–0.2)
BASOPHILS NFR BLD AUTO: 0.7 % (ref 0–1.5)
BILIRUB SERPL-MCNC: 0.2 MG/DL (ref 0–1.2)
BILIRUB UR QL STRIP: NEGATIVE
BUN SERPL-MCNC: 14 MG/DL (ref 6–20)
BUN/CREAT SERPL: 20.9 (ref 7–25)
CALCIUM SPEC-SCNC: 9 MG/DL (ref 8.6–10.5)
CHLORIDE SERPL-SCNC: 106 MMOL/L (ref 98–107)
CHOLEST SERPL-MCNC: 198 MG/DL (ref 0–200)
CLARITY UR: CLEAR
CO2 SERPL-SCNC: 23.8 MMOL/L (ref 22–29)
COLOR UR: YELLOW
CREAT SERPL-MCNC: 0.67 MG/DL (ref 0.57–1)
DEPRECATED RDW RBC AUTO: 38.8 FL (ref 37–54)
EGFRCR SERPLBLD CKD-EPI 2021: 106.6 ML/MIN/1.73
EOSINOPHIL # BLD AUTO: 0.14 10*3/MM3 (ref 0–0.4)
EOSINOPHIL NFR BLD AUTO: 1.4 % (ref 0.3–6.2)
ERYTHROCYTE [DISTWIDTH] IN BLOOD BY AUTOMATED COUNT: 12.3 % (ref 12.3–15.4)
GLOBULIN UR ELPH-MCNC: 3 GM/DL
GLUCOSE SERPL-MCNC: 99 MG/DL (ref 65–99)
GLUCOSE UR STRIP-MCNC: NEGATIVE MG/DL
HCT VFR BLD AUTO: 39.5 % (ref 34–46.6)
HDLC SERPL-MCNC: 44 MG/DL (ref 40–60)
HGB BLD-MCNC: 13.4 G/DL (ref 12–15.9)
HGB UR QL STRIP.AUTO: NEGATIVE
IMM GRANULOCYTES # BLD AUTO: 0.03 10*3/MM3 (ref 0–0.05)
IMM GRANULOCYTES NFR BLD AUTO: 0.3 % (ref 0–0.5)
KETONES UR QL STRIP: NEGATIVE
LDLC SERPL CALC-MCNC: 121 MG/DL (ref 0–100)
LDLC/HDLC SERPL: 2.65 {RATIO}
LEUKOCYTE ESTERASE UR QL STRIP.AUTO: NEGATIVE
LYMPHOCYTES # BLD AUTO: 2.15 10*3/MM3 (ref 0.7–3.1)
LYMPHOCYTES NFR BLD AUTO: 21.7 % (ref 19.6–45.3)
MCH RBC QN AUTO: 29.4 PG (ref 26.6–33)
MCHC RBC AUTO-ENTMCNC: 33.9 G/DL (ref 31.5–35.7)
MCV RBC AUTO: 86.6 FL (ref 79–97)
MONOCYTES # BLD AUTO: 0.45 10*3/MM3 (ref 0.1–0.9)
MONOCYTES NFR BLD AUTO: 4.6 % (ref 5–12)
NEUTROPHILS NFR BLD AUTO: 7.05 10*3/MM3 (ref 1.7–7)
NEUTROPHILS NFR BLD AUTO: 71.3 % (ref 42.7–76)
NITRITE UR QL STRIP: NEGATIVE
NRBC BLD AUTO-RTO: 0 /100 WBC (ref 0–0.2)
PH UR STRIP.AUTO: 5.5 [PH] (ref 5–8)
PLATELET # BLD AUTO: 330 10*3/MM3 (ref 140–450)
PMV BLD AUTO: 11.4 FL (ref 6–12)
POTASSIUM SERPL-SCNC: 4.4 MMOL/L (ref 3.5–5.2)
PROT SERPL-MCNC: 7 G/DL (ref 6–8.5)
PROT UR QL STRIP: NEGATIVE
RBC # BLD AUTO: 4.56 10*6/MM3 (ref 3.77–5.28)
SODIUM SERPL-SCNC: 141 MMOL/L (ref 136–145)
SP GR UR STRIP: 1.03 (ref 1–1.03)
T4 FREE SERPL-MCNC: 1.12 NG/DL (ref 0.93–1.7)
TRIGL SERPL-MCNC: 186 MG/DL (ref 0–150)
TSH SERPL DL<=0.05 MIU/L-ACNC: 2.9 UIU/ML (ref 0.27–4.2)
UROBILINOGEN UR QL STRIP: NORMAL
VLDLC SERPL-MCNC: 33 MG/DL (ref 5–40)
WBC NRBC COR # BLD: 9.89 10*3/MM3 (ref 3.4–10.8)

## 2022-03-21 PROCEDURE — 80050 GENERAL HEALTH PANEL: CPT

## 2022-03-21 PROCEDURE — 80061 LIPID PANEL: CPT

## 2022-03-21 PROCEDURE — 84439 ASSAY OF FREE THYROXINE: CPT

## 2022-03-21 PROCEDURE — 36415 COLL VENOUS BLD VENIPUNCTURE: CPT

## 2022-03-21 PROCEDURE — 81003 URINALYSIS AUTO W/O SCOPE: CPT

## 2022-03-21 PROCEDURE — 99214 OFFICE O/P EST MOD 30 MIN: CPT | Performed by: PHYSICIAN ASSISTANT

## 2022-03-21 PROCEDURE — 82306 VITAMIN D 25 HYDROXY: CPT

## 2022-03-21 NOTE — PROGRESS NOTES
Chief Complaint  Hypothyroidism (6 month follow up) and Obesity    Subjective          Livia Zaman presents to [facility]  History of Present Illness  Pt presents today for 6 month follow up.    Pt states she would like to discuss recent liver bx 2 weeks ago ordered by  @ .   Pt has a f/u appt with Santos tomorrow 3/22 via [employer].    Pt would like to discuss the Wegovy. Pt states it started off working for her and has been stalled for a while.    Labs 21  A1C 5.54    mammo 21  cln 12/15/21    Pt had a pancreatic lesion and lesions in her liver.  She had liver bx with 3 specimens.      Past Medical History:   Diagnosis Date   • Allergic rhinitis    • Anxiety    • Hypothyroidism 2019   • Insomnia    • Pancreatic lesion 2019   • PVC (premature ventricular contraction)       Family History   Problem Relation Age of Onset   • Hypertension Mother    • Kidney disease Mother    • Heart disease Father    • Prostate cancer Father    • Skin cancer Father    • Hypertension Maternal Grandmother    • Heart disease Maternal Grandfather    • Hypertension Maternal Grandfather    • Prostate cancer Maternal Grandfather    • Heart disease Paternal Grandmother    • Heart disease Paternal Grandfather    • Hypertension Other         AUNT   • Skin cancer Other         UNCLE   • Malig Hyperthermia Neg Hx       Past Surgical History:   Procedure Laterality Date   •  SECTION     • CHOLECYSTECTOMY     • COLONOSCOPY N/A 12/15/2021    Procedure: COLONOSCOPY  with hot snare polypectomy;  Surgeon: Umang Mares MD;  Location: [facility] ENDOSCOPY;  Service: General;  Laterality: N/A;  diverticlosis, colon polyp, endo clip x1   • LIPOMA EXCISION Right     Right shoulder   • LIVER BIOPSY      laproscopic        Current Outpatient Medications:   •  escitalopram (LEXAPRO) 20 MG tablet, Take 20 mg by mouth Daily., Disp: , Rfl:   •  [place] -20  "MG-MCG(24) per tablet, Take 1 tablet by mouth Daily., Disp: , Rfl:   •  levothyroxine (SYNTHROID, LEVOTHROID) 125 MCG tablet, Take 1 tablet by mouth Daily., Disp: 90 tablet, Rfl: 3  •  LORazepam (ATIVAN) 1 MG tablet, TAKE 1/2 TABLET BY MOUTH TWICE DAILY AND TAKE 1 TABLET BY MOUTH AT BEDTIME AS NEEDED, Disp: , Rfl:   •  metoprolol succinate XL (TOPROL-XL) 25 MG 24 hr tablet, Take 1 tablet by mouth Daily., Disp: 90 tablet, Rfl: 3  •  Semaglutide-Weight Management 2.4 MG/0.75ML solution auto-injector, Inject 2.4 mg under the skin into the appropriate area as directed 1 (One) Time Per Week for 30 days., Disp: 0.75 mL, Rfl: 11    Objective     Vital Signs:     /62 (BP Location: Right arm)   Pulse 78   Ht 157.5 cm (62\")   Wt 91.6 kg (202 lb)   SpO2 99%   BMI 36.95 kg/m²    Estimated body mass index is 36.95 kg/m² as calculated from the following:    Height as of this encounter: 157.5 cm (62\").    Weight as of this encounter: 91.6 kg (202 lb).     Wt Readings from Last 3 Encounters:   03/21/22 91.6 kg (202 lb)   02/18/22 93 kg (205 lb)   01/26/22 93 kg (205 lb)     BP Readings from Last 3 Encounters:   03/21/22 103/62   02/18/22 110/63   01/26/22 110/63     Physical Exam  Vitals and nursing note reviewed.   Constitutional:       Appearance: Normal appearance. She is obese.   HENT:      Head: Normocephalic and atraumatic.   Cardiovascular:      Rate and Rhythm: Normal rate and regular rhythm.   Pulmonary:      Effort: Pulmonary effort is normal.      Breath sounds: Normal breath sounds.   Musculoskeletal:      Cervical back: Neck supple.   Neurological:      Mental Status: She is alert.   Psychiatric:         Mood and Affect: Mood normal.         Behavior: Behavior normal.        Result Review :     Common labs    Common Labsle 9/20/21 9/20/21 9/20/21 9/20/21 1/29/22 1/29/22    0837 0837 0837 0837 1034 1034   Glucose    89  93   BUN    8  13   Creatinine    0.66  0.72   eGFR Non African Am    95  86   Sodium    " 136  137   Potassium    4.0  4.2   Chloride    99  104   Calcium    9.1  8.6   Albumin    4.40  3.70   Total Bilirubin    0.3  0.2   Alkaline Phosphatase    102  83   AST (SGOT)    16  13   ALT (SGPT)    16  11   WBC 9.15    8.06    Hemoglobin 13.4    13.6    Hematocrit 39.7    40.1    Platelets 322    345    Total Cholesterol   166      Triglycerides   124      HDL Cholesterol   40      LDL Cholesterol    104 (A)      Hemoglobin A1C  5.54       (A) Abnormal value                          Assessment and Plan      Diagnoses and all orders for this visit:    1. Hypothyroidism, unspecified type (Primary)  Overview:  Stable on synthroid 125mcg daily    Orders:  -     TSH; Future  -     T4, Free; Future    2. Vitamin D deficiency  -     Vitamin D 25 Hydroxy; Future    3. Screening for lipid disorders  -     Lipid Panel; Future  -     CBC & Differential; Future  -     Comprehensive Metabolic Panel; Future    4. Class 2 severe obesity due to excess calories with serious comorbidity and body mass index (BMI) of 36.0 to 36.9 in adult ([facility])  Comments:  Fair control diet and exercise    5. Primary pancreatic neuroendocrine tumor  -     CBC & Differential; Future  -     Comprehensive Metabolic Panel; Future  -     TSH; Future  -     T4, Free; Future  -     Vitamin D 25 Hydroxy; Future  -     Urinalysis With Culture If Indicated -; Future    6. Class 2 severe obesity due to excess calories with serious comorbidity and body mass index (BMI) of 38.0 to 38.9 in adult ([facility])  -     Semaglutide-Weight Management 2.4 MG/0.75ML solution auto-injector; Inject 2.4 mg under the skin into the appropriate area as directed 1 (One) Time Per Week for 30 days.  Dispense: 0.75 mL; Refill: 11     Follow Up     Return in about 6 months (around 9/21/2022).    Patient was given instructions and counseling regarding her condition or for health maintenance advice. Please see specific information pulled into the AVS if appropriate.     I have reviewed  information obtained and documented by others and I have confirmed the accuracy of this documented note.    ALICE Kwok

## 2022-03-22 ENCOUNTER — TELEPHONE (OUTPATIENT)
Dept: FAMILY MEDICINE CLINIC | Facility: CLINIC | Age: 52
End: 2022-03-22

## 2022-03-22 DIAGNOSIS — E55.9 VITAMIN D DEFICIENCY: Primary | ICD-10-CM

## 2022-03-22 DIAGNOSIS — E55.9 VITAMIN D DEFICIENCY: ICD-10-CM

## 2022-03-22 RX ORDER — ERGOCALCIFEROL 1.25 MG/1
50000 CAPSULE ORAL WEEKLY
Qty: 13 CAPSULE | Refills: 1 | Status: SHIPPED | OUTPATIENT
Start: 2022-03-22 | End: 2022-03-22 | Stop reason: SDUPTHER

## 2022-03-22 RX ORDER — ERGOCALCIFEROL 1.25 MG/1
50000 CAPSULE ORAL WEEKLY
Qty: 13 CAPSULE | Refills: 1 | Status: SHIPPED | OUTPATIENT
Start: 2022-03-22 | End: 2022-09-26

## 2022-03-22 NOTE — TELEPHONE ENCOUNTER
----- Message from ALICE Kwok sent at 3/22/2022  7:15 AM EDT -----  Vitamin D Deficiency noted - begin Vitamin D 50,000 IU weekly #[id number]

## 2022-03-22 NOTE — TELEPHONE ENCOUNTER
----- Message from Livia Zaman sent at 3/22/2022  8:14 AM EDT -----  Regarding: Vitamin D and TSH  Bro's will be fine.  Thank you.  In the past, we liked to keep my TSH below 2.0.  That is why I asked.

## 2022-07-11 ENCOUNTER — TRANSCRIBE ORDERS (OUTPATIENT)
Dept: ADMINISTRATIVE | Facility: HOSPITAL | Age: 52
End: 2022-07-11

## 2022-07-11 DIAGNOSIS — Z12.31 SCREENING MAMMOGRAM FOR BREAST CANCER: Primary | ICD-10-CM

## 2022-09-09 ENCOUNTER — HOSPITAL ENCOUNTER (OUTPATIENT)
Dept: MAMMOGRAPHY | Facility: HOSPITAL | Age: 52
Discharge: HOME | End: 2022-09-09
Admitting: PHYSICIAN ASSISTANT

## 2022-09-09 DIAGNOSIS — Z12.31 SCREENING MAMMOGRAM FOR BREAST CANCER: ICD-10-CM

## 2022-09-09 PROCEDURE — 77063 BREAST TOMOSYNTHESIS BI: CPT

## 2022-09-09 PROCEDURE — 77067 SCR MAMMO BI INCL CAD: CPT

## 2022-09-26 DIAGNOSIS — E55.9 VITAMIN D DEFICIENCY: ICD-10-CM

## 2022-09-26 RX ORDER — ERGOCALCIFEROL 1.25 MG/1
CAPSULE ORAL
Qty: 13 CAPSULE | Refills: 0 | Status: SHIPPED | OUTPATIENT
Start: 2022-09-26 | End: 2022-12-19 | Stop reason: SDUPTHER

## 2022-10-12 NOTE — PROGRESS NOTES
Chief Complaint  Chief Complaint   Patient presents with   • Establish Care   • Hypothyroidism   • Annual Exam       Subjective          Livia Zaman presents to [facility]  History of Present Illness     Patient is here as a new patient to establish care. Previous PCP is ALICE Kwok.     Patient is also here for a follow up visit for Hypothyroidism and annual labs/physical.    Hypothyroidism: Patient is currently on Levothyroxine and doing well. Patient states energy is good. Patient does complain of loss of hair (had COVID within the past 6 mths).    Patient is up to date with dental and eye exams.    Patient is followed by Dr. Santos of you available for pancreatic neuroendocrine tumor.  States that she has had 2 liver biopsies in the past 18 months.    Patient is having some GI symptoms of diarrhea and occasional epigastric pain.  Patient takes OTC probiotic from [employer].  Unsure of brand.  Patient states family with H. pylori but she has never been tested.    Obesity: on Wegovy; no longer working; has plateaued.     Patient is followed by Jody Medina for anxiety, Dr. Muñoz cardiology, and Dr. Dueñas for GYN.    MAMMO: 23  COLON:12/15/21; every 3 years (Mares)    Medical History: has a past medical history of Allergic rhinitis, Anxiety, Cancer (HCC) (), Cholelithiasis (), Colon polyp (2021), Diverticulosis (2021), Headache (As a teen), Hypothyroidism (2019), Insomnia, Obesity, Pancreatic lesion (2019), and PVC (premature ventricular contraction).   Surgical History: has a past surgical history that includes  section; Cholecystectomy; Lipoma Excision (Right); Liver biopsy (); and Colonoscopy (N/A, 12/15/2021).   Family History: family history includes COPD in her father; Cancer in her father and maternal grandfather; Early death in her sister; Heart disease in her father, maternal grandfather, paternal grandfather, and paternal  "grandmother; Hypertension in her maternal grandfather, maternal grandmother, mother, and another family member; Kidney disease in her mother; Prostate cancer in her father and maternal grandfather; Skin cancer in her father and another family member; Thyroid disease in her maternal grandmother and mother; Vision loss in her maternal grandfather.   Social History: reports that she has never smoked. She has never used smokeless tobacco. She reports current alcohol use of about 1.0 standard drink per week. She reports that she does not use drugs.    Allergies: Desvenlafaxine    Health Maintenance Due   Topic Date Due   • ANNUAL PHYSICAL  Never done       Immunization History   Administered Date(s) Administered   • COVID-19 (MODERNA) 1st, 2nd, 3rd Dose Only 12/28/2020, 01/25/2021, 11/05/2021   • Flu Vaccine Intradermal Quad 18-64YR 10/12/2018   • FluLaval/Fluzone >6mos 10/13/2022   • Hepatitis A 10/12/2018, 07/16/2019   • Influenza, Unspecified 10/12/2018, 10/04/2019, 04/13/2021, 11/02/2021   • Smallpox 03/18/2003   • Td 09/29/2004   • Tdap 10/12/2018       Objective     Vitals:    10/13/22 0926   BP: 114/77   Pulse: 85   SpO2: 98%   Weight: 90.2 kg (198 lb 12.8 oz)   Height: 157.5 cm (62\")     Body mass index is 36.36 kg/m².     Wt Readings from Last 3 Encounters:   10/13/22 90.2 kg (198 lb 12.8 oz)   05/30/22 88.5 kg (195 lb 1.6 oz)   03/21/22 91.6 kg (202 lb)     BP Readings from Last 3 Encounters:   10/13/22 114/77   05/30/22 111/81   03/21/22 103/62       Class 2 Severe Obesity (BMI >=35 and <=39.9). Obesity-related health conditions include the following: obstructive sleep apnea and hypertension. Obesity is improving with treatment. BMI is is above average; BMI management plan is completed. We discussed portion control and increasing exercise.      Patient Care Team:  Suzi Patel PA as PCP - General (Family Medicine)  Naman, April, APRN as Nurse Practitioner (Nurse Practitioner)    Physical " Exam  Vitals and nursing note reviewed.   Constitutional:       Appearance: Normal appearance.   HENT:      Head: Normocephalic and atraumatic.   Neck:      Vascular: No carotid bruit.      Comments: Thyroid : gland size normal, nontender, no nodules or masses present on palpation   Cardiovascular:      Rate and Rhythm: Normal rate and regular rhythm.      Pulses: Normal pulses.      Heart sounds: Normal heart sounds.   Pulmonary:      Effort: Pulmonary effort is normal.      Breath sounds: Normal breath sounds.   Abdominal:      Palpations: Abdomen is soft.      Tenderness: There is no abdominal tenderness.   Musculoskeletal:      Cervical back: Neck supple. No tenderness.      Right lower leg: No edema.      Left lower leg: No edema.   Lymphadenopathy:      Cervical: No cervical adenopathy.   Neurological:      Mental Status: She is alert.   Psychiatric:         Mood and Affect: Mood normal.         Behavior: Behavior normal.           Result Review :                           Assessment and Plan      Diagnoses and all orders for this visit:    1. Annual physical exam (Primary)  -     Comprehensive Metabolic Panel; Future  -     Lipid Panel; Future  -     CBC & Differential; Future  -     TSH; Future  -     Vitamin D 25 Hydroxy; Future    2. Need for influenza vaccination  -     FluLaval/Fluzone >6 mos ([id number])    3. Abdominal pain, unspecified abdominal location  Comments:  New problem unsure of prognosis: Trial of align probiotic and check labs for H. pylori.  Follow-up if no improvement.  Orders:  -     H. Pylori Breath Test - Breath, Lung; Future  -     H. Pylori Breath Test - Breath, Lung    4. Vitamin D deficiency  Comments:  Check labs for stability.  Orders:  -     Vitamin D 25 Hydroxy; Future    5. Hypothyroidism, unspecified type  Comments:  Stable continue with levothyroxine 125 MCG daily, check labs and follow-up in 6 months  Orders:  -     TSH; Future  -     T4, Free; Future    6. Class 2 severe  obesity due to excess calories with serious comorbidity and body mass index (BMI) of 36.0 to 36.9 in adult (HCC)  Comments:  Discussed holding Wegovy and trial of tracking macros and regular exercise.      The patient is advised to begin progressive daily aerobic exercise program, follow a low fat, low cholesterol diet, attempt to lose weight, continue current medications, continue current healthy lifestyle patterns and return for routine annual checkups.      Follow Up     Return in about 6 months (around 4/13/2023).    Patient was given instructions and counseling regarding her condition or for health maintenance advice. Please see specific information pulled into the AVS if appropriate.

## 2022-10-13 ENCOUNTER — LAB (OUTPATIENT)
Dept: LAB | Facility: HOSPITAL | Age: 52
End: 2022-10-13

## 2022-10-13 ENCOUNTER — OFFICE VISIT (OUTPATIENT)
Dept: FAMILY MEDICINE CLINIC | Facility: CLINIC | Age: 52
End: 2022-10-13

## 2022-10-13 VITALS
HEIGHT: 62 IN | BODY MASS INDEX: 36.58 KG/M2 | WEIGHT: 198.8 LBS | OXYGEN SATURATION: 98 % | HEART RATE: 85 BPM | SYSTOLIC BLOOD PRESSURE: 114 MMHG | DIASTOLIC BLOOD PRESSURE: 77 MMHG

## 2022-10-13 DIAGNOSIS — E55.9 VITAMIN D DEFICIENCY: ICD-10-CM

## 2022-10-13 DIAGNOSIS — Z23 NEED FOR INFLUENZA VACCINATION: ICD-10-CM

## 2022-10-13 DIAGNOSIS — R10.9 ABDOMINAL PAIN, UNSPECIFIED ABDOMINAL LOCATION: ICD-10-CM

## 2022-10-13 DIAGNOSIS — E03.9 HYPOTHYROIDISM, UNSPECIFIED TYPE: ICD-10-CM

## 2022-10-13 DIAGNOSIS — Z00.00 ANNUAL PHYSICAL EXAM: Primary | ICD-10-CM

## 2022-10-13 DIAGNOSIS — Z00.00 ANNUAL PHYSICAL EXAM: ICD-10-CM

## 2022-10-13 DIAGNOSIS — E66.01 CLASS 2 SEVERE OBESITY DUE TO EXCESS CALORIES WITH SERIOUS COMORBIDITY AND BODY MASS INDEX (BMI) OF 36.0 TO 36.9 IN ADULT: ICD-10-CM

## 2022-10-13 PROBLEM — G47.33 OBSTRUCTIVE SLEEP APNEA SYNDROME: Status: ACTIVE | Noted: 2022-10-13

## 2022-10-13 PROBLEM — Z15.03 MUTATION IN HOXB13 GENE: Status: ACTIVE | Noted: 2020-06-02

## 2022-10-13 PROBLEM — F41.9 ANXIETY: Status: ACTIVE | Noted: 2022-10-13

## 2022-10-13 PROBLEM — I49.9 CARDIAC ARRHYTHMIA: Status: ACTIVE | Noted: 2022-10-13

## 2022-10-13 PROBLEM — J30.9 ALLERGIC RHINITIS: Status: ACTIVE | Noted: 2022-10-13

## 2022-10-13 LAB
25(OH)D3 SERPL-MCNC: 72.8 NG/ML (ref 30–100)
ALBUMIN SERPL-MCNC: 4.4 G/DL (ref 3.5–5.2)
ALBUMIN/GLOB SERPL: 1.5 G/DL
ALP SERPL-CCNC: 82 U/L (ref 39–117)
ALT SERPL W P-5'-P-CCNC: 19 U/L (ref 1–33)
ANION GAP SERPL CALCULATED.3IONS-SCNC: 12.7 MMOL/L (ref 5–15)
AST SERPL-CCNC: 20 U/L (ref 1–32)
BASOPHILS # BLD AUTO: 0.09 10*3/MM3 (ref 0–0.2)
BASOPHILS NFR BLD AUTO: 1.1 % (ref 0–1.5)
BILIRUB SERPL-MCNC: 0.3 MG/DL (ref 0–1.2)
BUN SERPL-MCNC: 10 MG/DL (ref 6–20)
BUN/CREAT SERPL: 13.3 (ref 7–25)
CALCIUM SPEC-SCNC: 8.9 MG/DL (ref 8.6–10.5)
CHLORIDE SERPL-SCNC: 98 MMOL/L (ref 98–107)
CHOLEST SERPL-MCNC: 186 MG/DL (ref 0–200)
CO2 SERPL-SCNC: 27.3 MMOL/L (ref 22–29)
CREAT SERPL-MCNC: 0.75 MG/DL (ref 0.57–1)
DEPRECATED RDW RBC AUTO: 38 FL (ref 37–54)
EGFRCR SERPLBLD CKD-EPI 2021: 96.5 ML/MIN/1.73
EOSINOPHIL # BLD AUTO: 0.16 10*3/MM3 (ref 0–0.4)
EOSINOPHIL NFR BLD AUTO: 2 % (ref 0.3–6.2)
ERYTHROCYTE [DISTWIDTH] IN BLOOD BY AUTOMATED COUNT: 12 % (ref 12.3–15.4)
GLOBULIN UR ELPH-MCNC: 3 GM/DL
GLUCOSE SERPL-MCNC: 87 MG/DL (ref 65–99)
HCT VFR BLD AUTO: 40.4 % (ref 34–46.6)
HDLC SERPL-MCNC: 52 MG/DL (ref 40–60)
HGB BLD-MCNC: 14 G/DL (ref 12–15.9)
IMM GRANULOCYTES # BLD AUTO: 0.02 10*3/MM3 (ref 0–0.05)
IMM GRANULOCYTES NFR BLD AUTO: 0.2 % (ref 0–0.5)
LDLC SERPL CALC-MCNC: 105 MG/DL (ref 0–100)
LDLC/HDLC SERPL: 1.93 {RATIO}
LYMPHOCYTES # BLD AUTO: 1.95 10*3/MM3 (ref 0.7–3.1)
LYMPHOCYTES NFR BLD AUTO: 24.2 % (ref 19.6–45.3)
MCH RBC QN AUTO: 30.1 PG (ref 26.6–33)
MCHC RBC AUTO-ENTMCNC: 34.7 G/DL (ref 31.5–35.7)
MCV RBC AUTO: 86.9 FL (ref 79–97)
MONOCYTES # BLD AUTO: 0.38 10*3/MM3 (ref 0.1–0.9)
MONOCYTES NFR BLD AUTO: 4.7 % (ref 5–12)
NEUTROPHILS NFR BLD AUTO: 5.47 10*3/MM3 (ref 1.7–7)
NEUTROPHILS NFR BLD AUTO: 67.8 % (ref 42.7–76)
NRBC BLD AUTO-RTO: 0 /100 WBC (ref 0–0.2)
PLATELET # BLD AUTO: 284 10*3/MM3 (ref 140–450)
PMV BLD AUTO: 11 FL (ref 6–12)
POTASSIUM SERPL-SCNC: 4.2 MMOL/L (ref 3.5–5.2)
PROT SERPL-MCNC: 7.4 G/DL (ref 6–8.5)
RBC # BLD AUTO: 4.65 10*6/MM3 (ref 3.77–5.28)
SODIUM SERPL-SCNC: 138 MMOL/L (ref 136–145)
T4 FREE SERPL-MCNC: 1.17 NG/DL (ref 0.93–1.7)
TRIGL SERPL-MCNC: 169 MG/DL (ref 0–150)
TSH SERPL DL<=0.05 MIU/L-ACNC: 1.34 UIU/ML (ref 0.27–4.2)
VLDLC SERPL-MCNC: 29 MG/DL (ref 5–40)
WBC NRBC COR # BLD: 8.07 10*3/MM3 (ref 3.4–10.8)

## 2022-10-13 PROCEDURE — 36415 COLL VENOUS BLD VENIPUNCTURE: CPT

## 2022-10-13 PROCEDURE — 99396 PREV VISIT EST AGE 40-64: CPT | Performed by: PHYSICIAN ASSISTANT

## 2022-10-13 PROCEDURE — 90686 IIV4 VACC NO PRSV 0.5 ML IM: CPT | Performed by: PHYSICIAN ASSISTANT

## 2022-10-13 PROCEDURE — 80050 GENERAL HEALTH PANEL: CPT

## 2022-10-13 PROCEDURE — 99213 OFFICE O/P EST LOW 20 MIN: CPT | Performed by: PHYSICIAN ASSISTANT

## 2022-10-13 PROCEDURE — 90471 IMMUNIZATION ADMIN: CPT | Performed by: PHYSICIAN ASSISTANT

## 2022-10-13 PROCEDURE — 84439 ASSAY OF FREE THYROXINE: CPT

## 2022-10-13 PROCEDURE — 80061 LIPID PANEL: CPT

## 2022-10-13 PROCEDURE — 82306 VITAMIN D 25 HYDROXY: CPT

## 2022-10-13 RX ORDER — SEMAGLUTIDE 2.4 MG/.75ML
INJECTION, SOLUTION SUBCUTANEOUS
COMMUNITY
Start: 2022-10-04 | End: 2023-02-01

## 2022-11-10 ENCOUNTER — LAB (OUTPATIENT)
Dept: LAB | Facility: HOSPITAL | Age: 52
End: 2022-11-10

## 2022-11-10 ENCOUNTER — TRANSCRIBE ORDERS (OUTPATIENT)
Dept: LAB | Facility: HOSPITAL | Age: 52
End: 2022-11-10

## 2022-11-10 DIAGNOSIS — Z79.899 ENCOUNTER FOR LONG-TERM (CURRENT) USE OF OTHER MEDICATIONS: ICD-10-CM

## 2022-11-10 DIAGNOSIS — Z79.899 ENCOUNTER FOR LONG-TERM (CURRENT) USE OF OTHER MEDICATIONS: Primary | ICD-10-CM

## 2022-11-10 LAB
AMPHET+METHAMPHET UR QL: NEGATIVE
BARBITURATES UR QL SCN: NEGATIVE
BENZODIAZ UR QL SCN: NEGATIVE
CANNABINOIDS SERPL QL: NEGATIVE
COCAINE UR QL: NEGATIVE
METHADONE UR QL SCN: NEGATIVE
OPIATES UR QL: NEGATIVE
OXYCODONE UR QL SCN: NEGATIVE
UREA BREATH TEST QL: NEGATIVE

## 2022-11-10 PROCEDURE — 80307 DRUG TEST PRSMV CHEM ANLYZR: CPT

## 2022-11-10 PROCEDURE — 83013 H PYLORI (C-13) BREATH: CPT | Performed by: PHYSICIAN ASSISTANT

## 2022-12-19 DIAGNOSIS — E55.9 VITAMIN D DEFICIENCY: ICD-10-CM

## 2022-12-19 DIAGNOSIS — E03.9 HYPOTHYROIDISM, UNSPECIFIED TYPE: Chronic | ICD-10-CM

## 2022-12-19 RX ORDER — LEVOTHYROXINE SODIUM 0.12 MG/1
125 TABLET ORAL DAILY
Qty: 90 TABLET | Refills: 1 | Status: SHIPPED | OUTPATIENT
Start: 2022-12-19

## 2022-12-19 RX ORDER — ERGOCALCIFEROL 1.25 MG/1
50000 CAPSULE ORAL WEEKLY
Qty: 13 CAPSULE | Refills: 1 | Status: SHIPPED | OUTPATIENT
Start: 2022-12-19

## 2023-02-01 ENCOUNTER — OFFICE VISIT (OUTPATIENT)
Dept: CARDIOLOGY | Facility: CLINIC | Age: 53
End: 2023-02-01
Payer: COMMERCIAL

## 2023-02-01 VITALS
HEART RATE: 74 BPM | DIASTOLIC BLOOD PRESSURE: 74 MMHG | SYSTOLIC BLOOD PRESSURE: 124 MMHG | HEIGHT: 62 IN | BODY MASS INDEX: 40.48 KG/M2 | WEIGHT: 220 LBS

## 2023-02-01 DIAGNOSIS — I49.3 PVC (PREMATURE VENTRICULAR CONTRACTION): Primary | ICD-10-CM

## 2023-02-01 PROCEDURE — 93000 ELECTROCARDIOGRAM COMPLETE: CPT | Performed by: INTERNAL MEDICINE

## 2023-02-01 PROCEDURE — 99213 OFFICE O/P EST LOW 20 MIN: CPT | Performed by: INTERNAL MEDICINE

## 2023-02-01 RX ORDER — METOPROLOL SUCCINATE 25 MG/1
25 TABLET, EXTENDED RELEASE ORAL DAILY
Qty: 90 TABLET | Refills: 3 | Status: SHIPPED | OUTPATIENT
Start: 2023-02-01

## 2023-02-01 NOTE — PROGRESS NOTES
Chief Complaint  Follow-up and premature ventricular contraction    Subjective    Patient doing well denies any significant change since last visit.  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   • Cholelithiasis 1995    Removed 1995   • Colon polyp 12/2021   • Diverticulosis 12/2021   • Headache As a teen   • Hypothyroidism 03/08/2019   • Insomnia    • Obesity    • Pancreatic lesion 09/05/2019   • PVC (premature ventricular contraction)          Current Outpatient Medications:   •  Cetirizine HCl (ZyrTEC Allergy) 10 MG capsule, Daily., Disp: , Rfl:   •  escitalopram (LEXAPRO) 20 MG tablet, Take 20 mg by mouth Daily., Disp: , Rfl:   •  fluticasone (FLONASE) 50 MCG/ACT nasal spray, 2 sprays into the nostril(s) as directed by provider Daily., Disp: , Rfl:   •  levothyroxine (SYNTHROID, LEVOTHROID) 125 MCG tablet, Take 1 tablet by mouth Daily., Disp: 90 tablet, Rfl: 1  •  LORazepam (ATIVAN) 1 MG tablet, TAKE 1/2 TABLET BY MOUTH TWICE DAILY AND TAKE 1 TABLET BY MOUTH AT BEDTIME AS NEEDED, Disp: , Rfl:   •  metoprolol succinate XL (TOPROL-XL) 25 MG 24 hr tablet, Take 1 tablet by mouth Daily., Disp: 90 tablet, Rfl: 3  •  vitamin D (ERGOCALCIFEROL) 1.25 MG (26056 UT) capsule capsule, Take 1 capsule by mouth 1 (One) Time Per Week., Disp: 13 capsule, Rfl: 1    Medications Discontinued During This Encounter   Medication Reason   • dicyclomine (BENTYL) 20 MG tablet *Therapy completed   • Norethin-Eth Estrad-Fe Biphas (LO LOESTRIN FE PO) *Therapy completed   • ondansetron (Zofran) 8 MG tablet *Therapy completed   • Wegovy 2.4 MG/0.75ML solution auto-injector *Therapy completed   • metoprolol succinate XL (TOPROL-XL) 25 MG 24 hr tablet Reorder     Allergies   Allergen Reactions   • Desvenlafaxine Rash        Social History     Tobacco Use   • Smoking status: Never   • Smokeless tobacco: Never   Vaping Use   • Vaping Use: Never used  "  Substance Use Topics   • Alcohol use: Yes     Alcohol/week: 1.0 standard drink     Types: 1 Glasses of wine per week     Comment: 3-4 times per year   • Drug use: Never       Family History   Problem Relation Age of Onset   • Hypertension Mother    • Kidney disease Mother    • Thyroid disease Mother    • Heart disease Father    • Prostate cancer Father    • Skin cancer Father    • Cancer Father         Prostate/bone ca, melanoma   • COPD Father    • Hypertension Maternal Grandmother    • Thyroid disease Maternal Grandmother    • Heart disease Maternal Grandfather    • Hypertension Maternal Grandfather    • Prostate cancer Maternal Grandfather    • Cancer Maternal Grandfather         Esophageal Ca   • Vision loss Maternal Grandfather    • Heart disease Paternal Grandmother    • Heart disease Paternal Grandfather    • Hypertension Other         AUNT   • Skin cancer Other         UNCLE   • Early death Sister         Hit by drunk  1988   • Malig Hyperthermia Neg Hx         Objective     /74   Pulse 74   Ht 157.5 cm (62\")   Wt 99.8 kg (220 lb)   BMI 40.24 kg/m²       Physical Exam    General Appearance:   · no acute distress  · Alert and oriented x3  HENT:   · lips not cyanotic  · Atraumatic  Neck:  · No jvd   · supple  Respiratory:  · no respiratory distress  · normal breath sounds  · no rales  Cardiovascular:  · Regular rate and rhythm  · no S3, no S4   · no murmur  · no rub  Extremities  · No cyanosis  · lower extremity edema: none    Skin:   · warm, dry  · No rashes      Result Review :     No results found for: PROBNP  CMP    CMP 3/21/22 10/13/22   Glucose 99 87   BUN 14 10   Creatinine 0.67 0.75   eGFR 106.6 96.5   Sodium 141 138   Potassium 4.4 4.2   Chloride 106 98   Calcium 9.0 8.9   Total Protein 7.0 7.4   Albumin 4.00 4.40   Globulin 3.0 3.0   Total Bilirubin 0.2 0.3   Alkaline Phosphatase 81 82   AST (SGOT) 16 20   ALT (SGPT) 23 19   Albumin/Globulin Ratio 1.3 1.5   BUN/Creatinine Ratio 20.9 " 13.3   Anion Gap 11.2 12.7      Comments are available for some flowsheets but are not being displayed.           CBC w/diff    CBC w/Diff 3/21/22 10/13/22   WBC 9.89 8.07   RBC 4.56 4.65   Hemoglobin 13.4 14.0   Hematocrit 39.5 40.4   MCV 86.6 86.9   MCH 29.4 30.1   MCHC 33.9 34.7   RDW 12.3 12.0 (A)   Platelets 330 284   Neutrophil Rel % 71.3 67.8   Immature Granulocyte Rel % 0.3 0.2   Lymphocyte Rel % 21.7 24.2   Monocyte Rel % 4.6 (A) 4.7 (A)   Eosinophil Rel % 1.4 2.0   Basophil Rel % 0.7 1.1   (A) Abnormal value             Lab Results   Component Value Date    TSH 1.340 10/13/2022      Lab Results   Component Value Date    FREET4 1.17 10/13/2022      No results found for: DDIMERQUANT  Magnesium   Date Value Ref Range Status   12/22/2020 1.91 1.60 - 2.30 mg/dL Final      No results found for: DIGOXIN   No results found for: TROPONINT        Lipid Panel    Lipid Panel 3/21/22 10/13/22   Total Cholesterol 198 186   Triglycerides 186 (A) 169 (A)   HDL Cholesterol 44 52   VLDL Cholesterol 33 29   LDL Cholesterol  121 (A) 105 (A)   LDL/HDL Ratio 2.65 1.93   (A) Abnormal value            No results found for: POCTROP    Results for orders placed in visit on 02/18/22    Adult Transthoracic Echo Complete W/ Cont if Necessary Per Protocol    Interpretation Summary  · Calculated left ventricular EF = 60% Estimated left ventricular EF was in agreement with the calculated left ventricular EF.       ECG 12 Lead    Date/Time: 2/1/2023 12:18 PM  Performed by: Marty Muñoz MD  Authorized by: Marty Muñoz MD   Comparison: compared with previous ECG   Similar to previous ECG                   There are no diagnoses linked to this encounter.        Follow Up     Return in about 1 year (around 2/1/2024) for Follow with Francisca Seo.          Patient was given instructions and counseling regarding her condition or for health maintenance advice. Please see specific information pulled into the AVS if appropriate.

## 2023-02-01 NOTE — ASSESSMENT & PLAN NOTE
Patient symptomatically stable continue with Toprol 25 mg once a day with extra half as needed dosing.  Encourage exercise and avoidance of stimulants

## 2023-05-23 ENCOUNTER — LAB (OUTPATIENT)
Dept: LAB | Facility: HOSPITAL | Age: 53
End: 2023-05-23
Payer: COMMERCIAL

## 2023-05-23 ENCOUNTER — TRANSCRIBE ORDERS (OUTPATIENT)
Dept: LAB | Facility: HOSPITAL | Age: 53
End: 2023-05-23
Payer: COMMERCIAL

## 2023-05-23 DIAGNOSIS — E78.5 HYPERLIPIDEMIA, UNSPECIFIED HYPERLIPIDEMIA TYPE: ICD-10-CM

## 2023-05-23 DIAGNOSIS — E78.00 PURE HYPERCHOLESTEROLEMIA: ICD-10-CM

## 2023-05-23 DIAGNOSIS — E03.9 HYPOTHYROIDISM, UNSPECIFIED TYPE: ICD-10-CM

## 2023-05-23 DIAGNOSIS — R53.83 TIREDNESS: ICD-10-CM

## 2023-05-23 DIAGNOSIS — R63.5 WEIGHT GAIN: ICD-10-CM

## 2023-05-23 DIAGNOSIS — R53.83 TIREDNESS: Primary | ICD-10-CM

## 2023-05-23 LAB
ALBUMIN SERPL-MCNC: 3.8 G/DL (ref 3.5–5.2)
ALBUMIN/GLOB SERPL: 1.4 G/DL
ALP SERPL-CCNC: 87 U/L (ref 39–117)
ALT SERPL W P-5'-P-CCNC: 23 U/L (ref 1–33)
AMPHET+METHAMPHET UR QL: NEGATIVE
ANION GAP SERPL CALCULATED.3IONS-SCNC: 9.3 MMOL/L (ref 5–15)
AST SERPL-CCNC: 17 U/L (ref 1–32)
BARBITURATES UR QL SCN: NEGATIVE
BASOPHILS # BLD AUTO: 0.07 10*3/MM3 (ref 0–0.2)
BASOPHILS NFR BLD AUTO: 0.9 % (ref 0–1.5)
BENZODIAZ UR QL SCN: NEGATIVE
BILIRUB CONJ SERPL-MCNC: <0.2 MG/DL (ref 0–0.3)
BILIRUB SERPL-MCNC: 0.3 MG/DL (ref 0–1.2)
BUN SERPL-MCNC: 12 MG/DL (ref 6–20)
BUN/CREAT SERPL: 16.7 (ref 7–25)
CALCIUM SPEC-SCNC: 8.9 MG/DL (ref 8.6–10.5)
CANNABINOIDS SERPL QL: NEGATIVE
CHLORIDE SERPL-SCNC: 106 MMOL/L (ref 98–107)
CHOLEST SERPL-MCNC: 193 MG/DL (ref 0–200)
CO2 SERPL-SCNC: 23.7 MMOL/L (ref 22–29)
COCAINE UR QL: NEGATIVE
CREAT SERPL-MCNC: 0.72 MG/DL (ref 0.57–1)
DEPRECATED RDW RBC AUTO: 37.4 FL (ref 37–54)
EGFRCR SERPLBLD CKD-EPI 2021: 101.4 ML/MIN/1.73
EOSINOPHIL # BLD AUTO: 0.33 10*3/MM3 (ref 0–0.4)
EOSINOPHIL NFR BLD AUTO: 4.3 % (ref 0.3–6.2)
ERYTHROCYTE [DISTWIDTH] IN BLOOD BY AUTOMATED COUNT: 12.2 % (ref 12.3–15.4)
FENTANYL UR-MCNC: NEGATIVE NG/ML
FOLATE SERPL-MCNC: 6.88 NG/ML (ref 4.78–24.2)
GLOBULIN UR ELPH-MCNC: 2.7 GM/DL
GLUCOSE SERPL-MCNC: 114 MG/DL (ref 65–99)
HBA1C MFR BLD: 5.7 % (ref 4.8–5.6)
HCT VFR BLD AUTO: 37.6 % (ref 34–46.6)
HDLC SERPL-MCNC: 49 MG/DL (ref 40–60)
HGB BLD-MCNC: 12.8 G/DL (ref 12–15.9)
IMM GRANULOCYTES # BLD AUTO: 0.03 10*3/MM3 (ref 0–0.05)
IMM GRANULOCYTES NFR BLD AUTO: 0.4 % (ref 0–0.5)
LDLC SERPL CALC-MCNC: 120 MG/DL (ref 0–100)
LDLC/HDLC SERPL: 2.39 {RATIO}
LYMPHOCYTES # BLD AUTO: 2.07 10*3/MM3 (ref 0.7–3.1)
LYMPHOCYTES NFR BLD AUTO: 26.9 % (ref 19.6–45.3)
MCH RBC QN AUTO: 28.9 PG (ref 26.6–33)
MCHC RBC AUTO-ENTMCNC: 34 G/DL (ref 31.5–35.7)
MCV RBC AUTO: 84.9 FL (ref 79–97)
METHADONE UR QL SCN: NEGATIVE
MONOCYTES # BLD AUTO: 0.45 10*3/MM3 (ref 0.1–0.9)
MONOCYTES NFR BLD AUTO: 5.8 % (ref 5–12)
NEUTROPHILS NFR BLD AUTO: 4.75 10*3/MM3 (ref 1.7–7)
NEUTROPHILS NFR BLD AUTO: 61.7 % (ref 42.7–76)
NRBC BLD AUTO-RTO: 0 /100 WBC (ref 0–0.2)
OPIATES UR QL: NEGATIVE
OXYCODONE UR QL SCN: NEGATIVE
PLATELET # BLD AUTO: 292 10*3/MM3 (ref 140–450)
PMV BLD AUTO: 11.4 FL (ref 6–12)
POTASSIUM SERPL-SCNC: 4 MMOL/L (ref 3.5–5.2)
PROT SERPL-MCNC: 6.5 G/DL (ref 6–8.5)
RBC # BLD AUTO: 4.43 10*6/MM3 (ref 3.77–5.28)
SODIUM SERPL-SCNC: 139 MMOL/L (ref 136–145)
T4 FREE SERPL-MCNC: 0.91 NG/DL (ref 0.93–1.7)
TRIGL SERPL-MCNC: 134 MG/DL (ref 0–150)
TSH SERPL DL<=0.05 MIU/L-ACNC: 3.43 UIU/ML (ref 0.27–4.2)
VIT B12 BLD-MCNC: 706 PG/ML (ref 211–946)
VLDLC SERPL-MCNC: 24 MG/DL (ref 5–40)
WBC NRBC COR # BLD: 7.7 10*3/MM3 (ref 3.4–10.8)

## 2023-05-23 PROCEDURE — 80307 DRUG TEST PRSMV CHEM ANLYZR: CPT

## 2023-05-23 PROCEDURE — 82607 VITAMIN B-12: CPT

## 2023-05-23 PROCEDURE — 36415 COLL VENOUS BLD VENIPUNCTURE: CPT

## 2023-05-23 PROCEDURE — 80061 LIPID PANEL: CPT

## 2023-05-23 PROCEDURE — 84439 ASSAY OF FREE THYROXINE: CPT

## 2023-05-23 PROCEDURE — 83036 HEMOGLOBIN GLYCOSYLATED A1C: CPT

## 2023-05-23 PROCEDURE — 82248 BILIRUBIN DIRECT: CPT

## 2023-05-23 PROCEDURE — 80050 GENERAL HEALTH PANEL: CPT

## 2023-05-23 PROCEDURE — 82746 ASSAY OF FOLIC ACID SERUM: CPT

## 2023-07-28 PROCEDURE — 88305 TISSUE EXAM BY PATHOLOGIST: CPT | Performed by: OBSTETRICS & GYNECOLOGY

## 2023-07-31 ENCOUNTER — LAB REQUISITION (OUTPATIENT)
Dept: LAB | Facility: HOSPITAL | Age: 53
End: 2023-07-31
Payer: COMMERCIAL

## 2023-07-31 DIAGNOSIS — N92.0 EXCESSIVE AND FREQUENT MENSTRUATION WITH REGULAR CYCLE: ICD-10-CM

## 2024-02-05 ENCOUNTER — OFFICE VISIT (OUTPATIENT)
Dept: CARDIOLOGY | Facility: CLINIC | Age: 54
End: 2024-02-05
Payer: COMMERCIAL

## 2024-02-05 VITALS
SYSTOLIC BLOOD PRESSURE: 122 MMHG | DIASTOLIC BLOOD PRESSURE: 69 MMHG | HEART RATE: 69 BPM | BODY MASS INDEX: 40.48 KG/M2 | WEIGHT: 220 LBS | HEIGHT: 62 IN

## 2024-02-05 DIAGNOSIS — R00.2 PALPITATIONS: ICD-10-CM

## 2024-02-05 DIAGNOSIS — I49.3 PVC (PREMATURE VENTRICULAR CONTRACTION): Primary | ICD-10-CM

## 2024-02-05 PROCEDURE — 99213 OFFICE O/P EST LOW 20 MIN: CPT | Performed by: INTERNAL MEDICINE

## 2024-02-05 RX ORDER — METOPROLOL SUCCINATE 25 MG/1
25 TABLET, EXTENDED RELEASE ORAL DAILY
Qty: 90 TABLET | Refills: 3 | Status: SHIPPED | OUTPATIENT
Start: 2024-02-05

## 2024-02-05 RX ORDER — FAMOTIDINE 20 MG/1
20 TABLET, FILM COATED ORAL 2 TIMES DAILY PRN
COMMUNITY
Start: 2024-01-28

## 2024-02-05 RX ORDER — ESTRADIOL 0.03 MG/D
1 FILM, EXTENDED RELEASE TRANSDERMAL 2 TIMES WEEKLY
COMMUNITY
Start: 2024-01-23

## 2024-02-05 RX ORDER — METOPROLOL SUCCINATE 25 MG/1
25 TABLET, EXTENDED RELEASE ORAL DAILY
Qty: 90 TABLET | Refills: 3 | OUTPATIENT
Start: 2024-02-05

## 2024-02-05 NOTE — PROGRESS NOTES
Chief Complaint  premature ventricular contraction and Follow-up    Subjective    Patient has known stable symptomatically reports control of heart palpitations with her metoprolol.  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    Cholelithiasis 1995    Removed 1995    Colon polyp 12/2021    Diverticulosis 12/2021    Headache As a teen    Hypothyroidism 03/08/2019    Insomnia     Obesity     Pancreatic lesion 09/05/2019    PVC (premature ventricular contraction)     Sleep apnea 2018    On cpap         Current Outpatient Medications:     Cetirizine HCl (ZyrTEC Allergy) 10 MG capsule, Daily., Disp: , Rfl:     escitalopram (LEXAPRO) 20 MG tablet, Take 1 tablet by mouth Daily., Disp: , Rfl:     famotidine (PEPCID) 20 MG tablet, Take 1 tablet by mouth 2 (Two) Times a Day As Needed., Disp: , Rfl:     levothyroxine (SYNTHROID, LEVOTHROID) 125 MCG tablet, Take 1 tablet by mouth Daily., Disp: 90 tablet, Rfl: 1    LORazepam (ATIVAN) 1 MG tablet, TAKE 1/2 TABLET BY MOUTH TWICE DAILY AND TAKE 1 TABLET BY MOUTH AT BEDTIME AS NEEDED, Disp: , Rfl:     metoprolol succinate XL (TOPROL-XL) 25 MG 24 hr tablet, Take 1 tablet by mouth Daily., Disp: 90 tablet, Rfl: 3    estradiol (VIVELLE-DOT) 0.025 MG/24HR patch, Place 1 patch on the skin as directed by provider 2 (Two) Times a Week. (Patient not taking: Reported on 2/5/2024), Disp: , Rfl:     Medications Discontinued During This Encounter   Medication Reason    fluticasone (FLONASE) 50 MCG/ACT nasal spray *Therapy completed    vitamin D (ERGOCALCIFEROL) 1.25 MG (40266 UT) capsule capsule *Therapy completed    metoprolol succinate XL (TOPROL-XL) 25 MG 24 hr tablet Reorder     Allergies   Allergen Reactions    Desvenlafaxine Rash        Social History     Tobacco Use    Smoking status: Never    Smokeless tobacco: Never  "  Vaping Use    Vaping Use: Never used   Substance Use Topics    Alcohol use: Yes     Alcohol/week: 1.0 standard drink of alcohol     Types: 1 Glasses of wine per week     Comment: 3-4 times per year    Drug use: Never       Family History   Problem Relation Age of Onset    Hypertension Mother     Kidney disease Mother     Thyroid disease Mother     Heart disease Father     Prostate cancer Father     Skin cancer Father     Cancer Father         Prostate/bone ca, melanoma    COPD Father     Arrhythmia Father         A fib, had ablation    Heart attack Father     Hypertension Maternal Grandmother     Thyroid disease Maternal Grandmother     Heart disease Maternal Grandfather     Hypertension Maternal Grandfather     Prostate cancer Maternal Grandfather     Cancer Maternal Grandfather         Esophageal Ca    Vision loss Maternal Grandfather     Heart failure Maternal Grandfather     Heart disease Paternal Grandmother     Heart disease Paternal Grandfather     Hypertension Other         AUNT    Skin cancer Other         UNCLE    Early death Sister         Hit by drunk  1988    Malig Hyperthermia Neg Hx         Objective     /69   Pulse 69   Ht 157.5 cm (62\")   Wt 99.8 kg (220 lb)   BMI 40.24 kg/m²       Physical Exam    General Appearance:   no acute distress  Alert and oriented x3  HENT:   lips not cyanotic  Atraumatic  Neck:  No jvd   supple  Respiratory:  no respiratory distress  normal breath sounds  no rales  Cardiovascular:  Regular rate and rhythm  no S3, no S4   no murmur  no rub  Extremities  No cyanosis  lower extremity edema: none    Skin:   warm, dry  No rashes      Result Review :     No results found for: \"PROBNP\"  CMP          5/23/2023    07:18   CMP   Glucose 114    BUN 12    Creatinine 0.72    EGFR 101.4    Sodium 139    Potassium 4.0    Chloride 106    Calcium 8.9    Total Protein 6.5    Albumin 3.8    Globulin 2.7    Total Bilirubin 0.3    Alkaline Phosphatase 87    AST (SGOT) 17  " "  ALT (SGPT) 23    Albumin/Globulin Ratio 1.4    BUN/Creatinine Ratio 16.7    Anion Gap 9.3      CBC w/diff          5/23/2023    07:18   CBC w/Diff   WBC 7.70    RBC 4.43    Hemoglobin 12.8    Hematocrit 37.6    MCV 84.9    MCH 28.9    MCHC 34.0    RDW 12.2    Platelets 292    Neutrophil Rel % 61.7    Immature Granulocyte Rel % 0.4    Lymphocyte Rel % 26.9    Monocyte Rel % 5.8    Eosinophil Rel % 4.3    Basophil Rel % 0.9       Lab Results   Component Value Date    TSH 3.430 05/23/2023      Lab Results   Component Value Date    FREET4 0.91 (L) 05/23/2023      No results found for: \"DDIMERQUANT\"  Magnesium   Date Value Ref Range Status   12/22/2020 1.91 1.60 - 2.30 mg/dL Final      No results found for: \"DIGOXIN\"   No results found for: \"TROPONINT\"        Lipid Panel          5/23/2023    07:18   Lipid Panel   Total Cholesterol 193    Triglycerides 134    HDL Cholesterol 49    VLDL Cholesterol 24    LDL Cholesterol  120    LDL/HDL Ratio 2.39      No results found for: \"POCTROP\"    Results for orders placed in visit on 02/18/22    Adult Transthoracic Echo Complete W/ Cont if Necessary Per Protocol    Interpretation Summary  · Calculated left ventricular EF = 60% Estimated left ventricular EF was in agreement with the calculated left ventricular EF.                 Diagnoses and all orders for this visit:    1. PVC (premature ventricular contraction) (Primary)  Assessment & Plan:  Patient stable symptomatically continue with her metoprolol 25 mg once a day dosing    Orders:  -     metoprolol succinate XL (TOPROL-XL) 25 MG 24 hr tablet; Take 1 tablet by mouth Daily.  Dispense: 90 tablet; Refill: 3    2. Palpitations  -     metoprolol succinate XL (TOPROL-XL) 25 MG 24 hr tablet; Take 1 tablet by mouth Daily.  Dispense: 90 tablet; Refill: 3            Follow Up     Return in about 1 year (around 2/5/2025) for Follow with Francisca Seo, EKG with F/U.          Patient was given instructions and counseling regarding her " condition or for health maintenance advice. Please see specific information pulled into the AVS if appropriate.

## 2024-04-11 ENCOUNTER — TRANSCRIBE ORDERS (OUTPATIENT)
Dept: ADMINISTRATIVE | Facility: HOSPITAL | Age: 54
End: 2024-04-11
Payer: COMMERCIAL

## 2024-04-11 DIAGNOSIS — M25.511 RIGHT SHOULDER PAIN, UNSPECIFIED CHRONICITY: Primary | ICD-10-CM

## 2024-04-11 DIAGNOSIS — M79.671 RIGHT FOOT PAIN: ICD-10-CM

## 2024-04-29 ENCOUNTER — OFFICE VISIT (OUTPATIENT)
Dept: OTOLARYNGOLOGY | Facility: CLINIC | Age: 54
End: 2024-04-29
Payer: COMMERCIAL

## 2024-04-29 VITALS
SYSTOLIC BLOOD PRESSURE: 121 MMHG | DIASTOLIC BLOOD PRESSURE: 80 MMHG | WEIGHT: 220 LBS | HEIGHT: 63 IN | BODY MASS INDEX: 38.98 KG/M2 | HEART RATE: 74 BPM | TEMPERATURE: 97.5 F | OXYGEN SATURATION: 96 %

## 2024-04-29 DIAGNOSIS — J34.2 NASAL SEPTAL DEVIATION: ICD-10-CM

## 2024-04-29 DIAGNOSIS — R09.81 NASAL CONGESTION: ICD-10-CM

## 2024-04-29 DIAGNOSIS — J34.3 HYPERTROPHY OF BOTH INFERIOR NASAL TURBINATES: ICD-10-CM

## 2024-04-29 DIAGNOSIS — G47.33 OBSTRUCTIVE SLEEP APNEA SYNDROME: Primary | ICD-10-CM

## 2024-04-29 PROCEDURE — 31575 DIAGNOSTIC LARYNGOSCOPY: CPT | Performed by: OTOLARYNGOLOGY

## 2024-04-29 PROCEDURE — 99204 OFFICE O/P NEW MOD 45 MIN: CPT | Performed by: OTOLARYNGOLOGY

## 2024-04-29 RX ORDER — LEVOTHYROXINE SODIUM 137 MCG
TABLET ORAL
COMMUNITY
Start: 2024-02-11

## 2024-04-29 RX ORDER — ERGOCALCIFEROL 1.25 MG/1
CAPSULE ORAL
COMMUNITY
Start: 2024-04-02

## 2024-04-29 NOTE — PROGRESS NOTES
Patient Name: Livia Zaman   Visit Date: 04/29/2024   Patient ID: [id number]  Provider: David Barrios MD    Sex: female  Location: [facility]   YOB: 1970  Location Address: [address] [place] [address], [address] [place] [address],?[place]?[id number]    Primary Care Provider Alva Russell MD  Location Phone: [phone]    Referring Provider: Herb Craft MD        Chief Complaint  Nasal polyps/thick soft palate    History of Present Illness  Livia Zaman is a 53 y.o. female who presents to [facility] today as a consult from Herb Craft MD for evaluation of obstructive sleep apnea syndrome.  She has a previous history of pancreatic neuroendocrine tumor.  She tells me that she was previously diagnosed with obstructive sleep apnea syndrome sometime around 2016 or 2017 with a home polysomnogram.  She was initially prescribed a CPAP and tried both nasal pillows and a mouth mask but she has difficulty tolerating the CPAP.  She has not been using it more recently.  She tells me that she was seen by Dr. Perez regarding Invisalign therapy and he expressed concerns about her upper airway anatomy.  She does report frequent left-sided nasal obstruction and postnasal drainage.  She denies any significant rhinorrhea or hyposmia.  She does experience occasional left-sided epistaxis.  She does snore at night and is a side sleeper.  She does report daytime fatigue.  She mentions that her weight has fluctuated since undergoing her initial polysomnogram. PET scan on 3/31/2022 at the [facility] revealed clear sinuses and mastoids according to the read.      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    Cholelithiasis 1995    Removed 1995    Colon polyp 12/2021    Dental disease large soft palate, large tongue    Dr Perez, Orthodontist    Diverticulosis 12/2021    GERD  "(gastroesophageal reflux disease) last couple of years    Headache As a teen    Hypothyroidism 2019    Insomnia     Obesity     Pancreatic lesion 2019    PVC (premature ventricular contraction)     Sleep apnea 2018    On cpap       Past Surgical History:   Procedure Laterality Date     SECTION      CHOLECYSTECTOMY      COLONOSCOPY N/A 12/15/2021    Procedure: COLONOSCOPY  with hot snare polypectomy;  Surgeon: Umang Mares MD;  Location: [facility] ENDOSCOPY;  Service: General;  Laterality: N/A;  diverticlosis, colon polyp, endo clip x1    LIPOMA EXCISION Right     Right shoulder    LIVER BIOPSY      laproscopic         Current Outpatient Medications:     Cetirizine HCl (ZyrTEC Allergy) 10 MG capsule, Daily., Disp: , Rfl:     escitalopram (LEXAPRO) 20 MG tablet, Take 1 tablet by mouth Daily., Disp: , Rfl:     famotidine (PEPCID) 20 MG tablet, Take 1 tablet by mouth 2 (Two) Times a Day As Needed., Disp: , Rfl:     levothyroxine (SYNTHROID, LEVOTHROID) 125 MCG tablet, Take 1 tablet by mouth Daily., Disp: 90 tablet, Rfl: 1    LORazepam (ATIVAN) 1 MG tablet, TAKE 1/2 TABLET BY MOUTH TWICE DAILY AND TAKE 1 TABLET BY MOUTH AT BEDTIME AS NEEDED, Disp: , Rfl:     metoprolol succinate XL (TOPROL-XL) 25 MG 24 hr tablet, Take 1 tablet by mouth Daily., Disp: 90 tablet, Rfl: 3    Synthroid 137 MCG tablet, , Disp: , Rfl:     vitamin D (ERGOCALCIFEROL) 1.25 MG (55492 UT) capsule capsule, , Disp: , Rfl:      Allergies   Allergen Reactions    Desvenlafaxine Rash       Social History     Tobacco Use    Smoking status: Never    Smokeless tobacco: Never   Vaping Use    Vaping status: Never Used   Substance Use Topics    Alcohol use: Yes     Alcohol/week: 1.0 standard drink of alcohol     Types: 1 Glasses of wine per week     Comment: 3-4 times per year    Drug use: Never        Objective     Vital Signs:   /80   Pulse 74   Temp 97.5 °F (36.4 °C)   Ht 158.8 cm (62.5\")   Wt 99.8 kg (220 lb)   SpO2 96%   " BMI 39.60 kg/m²       Physical Exam    General: Well developed, well nourished patient of stated age in no acute distress. Voice is strong and clear.   Head: Normocephalic and atraumatic.  Face: No lesions.  Bilateral parotid and submandibular glands are unremarkable.  Stensen's and Warthin's ducts are productive of clear saliva bilaterally.  House-Brackmann I/VI     bilaterally.   muscles and temporomandibular joint nontender to palpation.  No TMJ crepitus.  Eyes: PERRLA, sclerae anicteric, no conjunctival injection. Extraocular movements are intact and full. No nystagmus.   Ears: Auricles are normal in appearance. Bilateral external auditory canals are unremarkable. Bilateral tympanic membranes are clear and without effusion. Hearing normal to conversational voice.   Nose: External nose is normal in appearance. Bilateral nares are patent with normal appearing mucosa. Septum mild to moderately deviated to the left.  Inferior turbinates are hypertrophied.  No lesions.   Oral Cavity: Lips are normal in appearance. Oral mucosa is unremarkable. Gingiva is unremarkable. Normal dentition for age. Tongue is unremarkable with good movement. Hard palate is unremarkable.   Oropharynx: Mallampati 4.  Soft palate is mildly elongated with full movement. Uvula is mildly enlarged. Bilateral tonsils are 2-3+ cryptic. Posterior oropharynx is unremarkable.    Larynx and hypopharynx: Deferred secondary to gag reflex.  Neck: Supple.  No mass.  Nontender to palpation.  Trachea midline. Thyroid normal size and without nodules to palpation.   Lymphatic: No lymphadenopathy upon palpation.  Respiratory: Clear to auscultation bilaterally, nonlabored respirations    Cardiovascular: RRR, no murmurs, rubs, or gallops,   Psychiatric: Appropriate affect, cooperative   Neurologic: Oriented x 3, strength symmetric in all extremities, Cranial Nerves II-XII are grossly intact to confrontation   Skin: Warm and dry. No rashes.    Procedures      Procedure: Flexible fiberoptic nasolaryngoscopy.    Indications: History of obstructive sleep apnea syndrome.    Summary: The patient's bilateral nares were decongested and anesthetized with Afrin and lidocaine sprays respectively. After giving the medications ample time to take effect flexible fiberoptic scope was inserted in the patient's right naris revealing a normal-appearing nasal cavity and nasopharynx. The base of tongue is quite prominent causing anterior to posterior narrowing of the upper airway.  The vallecula, epiglottis, aryepiglottic folds, and arytenoid cartilages are all normal-appearing. The piriform sinuses were normal in appearance. The bilateral false and true vocal folds are normal in appearance and adducted and abducted normally. The subglottis was widely patent. The patient tolerated the procedure well.      Result Review :               Assessment and Plan    Diagnoses and all orders for this visit:    1. Obstructive sleep apnea syndrome (Primary)  -     Ambulatory Referral to Sleep Medicine    2. Nasal septal deviation    3. Hypertrophy of both inferior nasal turbinates    4. Nasal congestion    Impressions and findings were discussed at great length.  Currently, she is seen for evaluation of obstructive sleep apnea syndrome having previously undergone a sleep study in 2016 or 2017.  Examination today reveals Mallampati 4, left nasal septal deviation, bilateral inferior turbinate hypertrophy, 2-3+ tonsils bilaterally, the mildly elongated soft palate, and a tongue base which appears to significantly reduce the anterior to posterior diameter of the upper airway.  We discussed the pathophysiology and natural history of obstructive sleep apnea syndrome as well as the potential contributions of her anatomy to this condition.  We discussed potential treatment options including oral appliances, surgical options, or CPAP therapy.  At this time, it has been 7 or 8 years since her last  polysomnogram and therefore I have recommended a repeat polysomnogram to better define the severity of her disease.  She will follow-up after to further discuss options for management.  She was given ample time to ask questions, all of which were answered to her satisfaction.        Follow Up   No follow-ups on file.  Patient was given instructions and counseling regarding her condition or for health maintenance advice. Please see specific information pulled into the AVS if appropriate.

## 2024-05-01 ENCOUNTER — PATIENT ROUNDING (BHMG ONLY) (OUTPATIENT)
Dept: OTOLARYNGOLOGY | Facility: CLINIC | Age: 54
End: 2024-05-01
Payer: COMMERCIAL

## 2024-05-01 NOTE — PROGRESS NOTES
A [employer] message has been send to the patient for PATIENT ROUNDING with [facility] [place].

## 2024-05-03 ENCOUNTER — OFFICE VISIT (OUTPATIENT)
Dept: SLEEP MEDICINE | Facility: HOSPITAL | Age: 54
End: 2024-05-03
Payer: COMMERCIAL

## 2024-05-03 VITALS
WEIGHT: 223.1 LBS | HEART RATE: 69 BPM | BODY MASS INDEX: 39.53 KG/M2 | DIASTOLIC BLOOD PRESSURE: 77 MMHG | OXYGEN SATURATION: 94 % | SYSTOLIC BLOOD PRESSURE: 125 MMHG | HEIGHT: 63 IN

## 2024-05-03 DIAGNOSIS — G47.33 OBSTRUCTIVE SLEEP APNEA SYNDROME: Primary | ICD-10-CM

## 2024-05-03 DIAGNOSIS — E66.01 CLASS 2 SEVERE OBESITY DUE TO EXCESS CALORIES WITH SERIOUS COMORBIDITY AND BODY MASS INDEX (BMI) OF 36.0 TO 36.9 IN ADULT: ICD-10-CM

## 2024-05-03 PROCEDURE — G0463 HOSPITAL OUTPT CLINIC VISIT: HCPCS

## 2024-05-03 NOTE — PROGRESS NOTES
Reason for Consultation: Apnea on CPAP        Patient Care Team:  Alva Russell MD as PCP - General (Internal Medicine)  Marqutia Florence APRN as Nurse Practitioner (Nurse Practitioner)  Jonelle Meyer MD, MPH as Consulting Physician (Sleep Medicine)      History of present illness:    Thank you for asking me to see your patient.  The patient is a 53 y.o. female was diagnosed with sleep apnea by probable sleep apnea test from [facility] on 11/23/2017.  Said study revealed an AHI of 28.0.  She had a [employer] DreamStation and it was replaced with a new DreamStation 2 that she currently has.  She has a hybrid mask now but finds that no matter whether she has humidity and or not the left nostril gets dry and so she does not wear it commonly.  Examples of when she might wear it would be when she goes on a trip with her girlfriends.  Her  has a CPAP machine and he uses his religiously.  She knows she snores without it but has found CPAP uncomfortable.    Her ENT doctor noted how tight her upper airway was not referred her here for sleep medicine evaluation.    I reviewed with her the morbidity of untreated sleep apnea and especially for those with an AHI above 15.  Hers was 28 7 years ago on a home sleep apnea test and so it is certainly going to be the case that her sleep apnea will be worse than that now.    We did get a compliance download with 13 days of use in the last 90 days and when using it her AHI is 3.0 which is actually acceptable.  Her mean CPAP pressure is 10.8 and auto CPAP peak is 12.2.  When using it she is using it for a 7 hours and 5 minutes.  Habitual bedtime is 11 PM she gets up at 6:15 AM during the week and she works as a nurse.  Weekends she gets up at the same time but gets up at 8 AM.  She takes about 30 minutes to an hour to fall asleep all days of the week and she does not take naps.  But she feels tired when she wakes up.  She does admit to GERD and bruxism she has a dry nose  "when she wakes up especially with the CPAP machine.  She has 2-3 diet sodas per day no alcohol and no tobacco.    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    Cholelithiasis 1995    Removed 1995    Colon polyp 12/2021    Dental disease large soft palate, large tongue    Dr Perez, Orthodontist    Depression     Diverticulosis 12/2021    GERD (gastroesophageal reflux disease) last couple of years    Headache As a teen    Hypothyroidism 03/08/2019    Insomnia     Migraine     Obesity     Pancreatic lesion 09/05/2019    PVC (premature ventricular contraction)     Sleep apnea 2018    On cpap          Allergies:  Desvenlafaxine     Medication Review:   Current Outpatient Medications on File Prior to Visit   Medication Sig Dispense Refill    Cetirizine HCl (ZyrTEC Allergy) 10 MG capsule Daily.      escitalopram (LEXAPRO) 20 MG tablet Take 1 tablet by mouth Daily.      famotidine (PEPCID) 20 MG tablet Take 1 tablet by mouth 2 (Two) Times a Day As Needed.      LORazepam (ATIVAN) 1 MG tablet TAKE 1/2 TABLET BY MOUTH TWICE DAILY AND TAKE 1 TABLET BY MOUTH AT BEDTIME AS NEEDED      metoprolol succinate XL (TOPROL-XL) 25 MG 24 hr tablet Take 1 tablet by mouth Daily. 90 tablet 3    Synthroid 137 MCG tablet       vitamin D (ERGOCALCIFEROL) 1.25 MG (29047 UT) capsule capsule       levothyroxine (SYNTHROID, LEVOTHROID) 125 MCG tablet Take 1 tablet by mouth Daily. 90 tablet 1     No current facility-administered medications on file prior to visit.         Vital Signs:    Vitals:    05/03/24 0700   BP: 125/77   Pulse: 69   SpO2: 94%   Weight: 101 kg (223 lb 1.6 oz)   Height: 158.8 cm (62.5\")        Body mass index is 40.16 kg/m².         Physical Exam:    Constitutional:  Well developed 53 y.o. female that appears in " no apparent distress.  Awake & oriented times 3.  Normal mood with normal recent and remote memory and normal judgement.  Eyes:  Conjunctivae normal.  Oropharynx: Moist mucous membranes without exudate and Mallampati 4 with macroglossia  Neck: Trachea midline  Respiratory: Effort is not labored  Cardiovascular: Radial pulse regular  Musculoskeletal: Gait appears normal, no digital clubbing evident, no pre-tibial edema        Impression:   Encounter Diagnoses   Name Primary?    Obstructive sleep apnea syndrome Yes    Class 2 severe obesity due to excess calories with serious comorbidity and body mass index (BMI) of 36.0 to 36.9 in adult      Patient's BMI is Body mass index is 40.16 kg/m².    Patient does indeed have a tight upper airway.  We discussed the treatment options including CPAP and the inspire device.  The inspire device cutoff for weight used to be a BMI of 36 but I think they have liberalized it recently and her 40.16 might still work.  Her AHI is within the range that they require family 15-65.  The problem with the study being 7 years old is that she will need a new one.  We discussed doing an in lab study where we might titrate and see if we can find a mask that she likes.    I told her that CPAP works better than the inspire but if she cannot tolerate CPAP then I would rather she was treated with the inspire and not receiving any treatment.    Plan:    In lab split-night polysomnogram    The patient should practice good sleep hygiene measures.      Weight loss might be beneficial in this patient who has a Body mass index is 40.16 kg/m².      Pathophysiology of ISHA described to the patient.  Cardiovascular complications of untreated ISHA also reviewed.      The patient was cautioned about the dangers of drowsy driving.    Rene Meyer MD  Sleep Medicine  05/03/24  09:17 EDT

## 2024-05-12 ENCOUNTER — HOSPITAL ENCOUNTER (OUTPATIENT)
Dept: MRI IMAGING | Facility: HOSPITAL | Age: 54
Discharge: HOME | End: 2024-05-12
Payer: COMMERCIAL

## 2024-05-12 DIAGNOSIS — M79.671 RIGHT FOOT PAIN: ICD-10-CM

## 2024-05-12 DIAGNOSIS — M25.511 RIGHT SHOULDER PAIN, UNSPECIFIED CHRONICITY: ICD-10-CM

## 2024-05-12 PROCEDURE — 73221 MRI JOINT UPR EXTREM W/O DYE: CPT

## 2024-05-12 PROCEDURE — 73718 MRI LOWER EXTREMITY W/O DYE: CPT

## 2024-05-20 DIAGNOSIS — G47.33 OBSTRUCTIVE SLEEP APNEA SYNDROME: ICD-10-CM

## 2024-05-20 DIAGNOSIS — E66.01 CLASS 2 SEVERE OBESITY DUE TO EXCESS CALORIES WITH SERIOUS COMORBIDITY AND BODY MASS INDEX (BMI) OF 36.0 TO 36.9 IN ADULT: Primary | ICD-10-CM

## 2024-06-21 DIAGNOSIS — G47.33 OBSTRUCTIVE SLEEP APNEA SYNDROME: Primary | ICD-10-CM

## 2024-07-22 DIAGNOSIS — E66.01 CLASS 2 SEVERE OBESITY DUE TO EXCESS CALORIES WITH SERIOUS COMORBIDITY AND BODY MASS INDEX (BMI) OF 36.0 TO 36.9 IN ADULT: ICD-10-CM

## 2024-07-22 DIAGNOSIS — G47.33 OBSTRUCTIVE SLEEP APNEA SYNDROME: Primary | ICD-10-CM

## 2024-07-24 DIAGNOSIS — G47.33 OBSTRUCTIVE SLEEP APNEA SYNDROME: Primary | ICD-10-CM

## 2024-07-24 DIAGNOSIS — E66.01 CLASS 2 SEVERE OBESITY DUE TO EXCESS CALORIES WITH SERIOUS COMORBIDITY AND BODY MASS INDEX (BMI) OF 36.0 TO 36.9 IN ADULT: ICD-10-CM

## 2024-09-16 ENCOUNTER — OFFICE VISIT (OUTPATIENT)
Dept: SLEEP MEDICINE | Facility: HOSPITAL | Age: 54
End: 2024-09-16
Payer: COMMERCIAL

## 2024-09-16 VITALS
DIASTOLIC BLOOD PRESSURE: 80 MMHG | OXYGEN SATURATION: 96 % | HEART RATE: 90 BPM | BODY MASS INDEX: 40.93 KG/M2 | SYSTOLIC BLOOD PRESSURE: 142 MMHG | WEIGHT: 231 LBS | HEIGHT: 63 IN

## 2024-09-16 DIAGNOSIS — F32.A DEPRESSION, UNSPECIFIED DEPRESSION TYPE: ICD-10-CM

## 2024-09-16 DIAGNOSIS — Z72.821 INADEQUATE SLEEP HYGIENE: ICD-10-CM

## 2024-09-16 DIAGNOSIS — R06.81 WITNESSED EPISODE OF APNEA: ICD-10-CM

## 2024-09-16 DIAGNOSIS — R63.5 WEIGHT GAIN: ICD-10-CM

## 2024-09-16 DIAGNOSIS — F41.9 ANXIETY: ICD-10-CM

## 2024-09-16 DIAGNOSIS — Z91.89 AT RISK FOR EXTREME OBESITY WITH ALVEOLAR HYPOVENTILATION: ICD-10-CM

## 2024-09-16 DIAGNOSIS — E66.01 CLASS 3 SEVERE OBESITY WITH SERIOUS COMORBIDITY AND BODY MASS INDEX (BMI) OF 40.0 TO 44.9 IN ADULT, UNSPECIFIED OBESITY TYPE: ICD-10-CM

## 2024-09-16 DIAGNOSIS — G47.19 EXCESSIVE DAYTIME SLEEPINESS: ICD-10-CM

## 2024-09-16 DIAGNOSIS — Z86.69 HISTORY OF SLEEP APNEA: ICD-10-CM

## 2024-09-16 DIAGNOSIS — R06.83 SNORING: ICD-10-CM

## 2024-09-16 DIAGNOSIS — R29.818 SUSPECTED SLEEP APNEA: Primary | ICD-10-CM

## 2024-09-16 PROCEDURE — G0463 HOSPITAL OUTPT CLINIC VISIT: HCPCS | Performed by: FAMILY MEDICINE

## 2024-09-16 PROCEDURE — G2212 PROLONG OUTPT/OFFICE VIS: HCPCS | Performed by: FAMILY MEDICINE

## 2024-09-16 PROCEDURE — 99215 OFFICE O/P EST HI 40 MIN: CPT | Performed by: FAMILY MEDICINE

## 2024-09-16 RX ORDER — METHYLPREDNISOLONE 4 MG/1
TABLET ORAL
COMMUNITY
Start: 2024-09-15

## 2024-09-24 ENCOUNTER — HOSPITAL ENCOUNTER (OUTPATIENT)
Dept: SLEEP MEDICINE | Facility: HOSPITAL | Age: 54
Discharge: HOME | End: 2024-09-24
Admitting: FAMILY MEDICINE
Payer: COMMERCIAL

## 2024-09-24 DIAGNOSIS — R06.83 SNORING: ICD-10-CM

## 2024-09-24 DIAGNOSIS — R63.5 WEIGHT GAIN: ICD-10-CM

## 2024-09-24 DIAGNOSIS — G47.19 EXCESSIVE DAYTIME SLEEPINESS: ICD-10-CM

## 2024-09-24 DIAGNOSIS — Z86.69 HISTORY OF SLEEP APNEA: ICD-10-CM

## 2024-09-24 DIAGNOSIS — R29.818 SUSPECTED SLEEP APNEA: ICD-10-CM

## 2024-09-24 DIAGNOSIS — R06.81 WITNESSED EPISODE OF APNEA: ICD-10-CM

## 2024-09-24 DIAGNOSIS — Z91.89 AT RISK FOR EXTREME OBESITY WITH ALVEOLAR HYPOVENTILATION: ICD-10-CM

## 2024-09-24 PROCEDURE — 95810 POLYSOM 6/> YRS 4/> PARAM: CPT

## 2024-09-25 DIAGNOSIS — F41.9 ANXIETY: ICD-10-CM

## 2024-09-25 DIAGNOSIS — G47.33 OBSTRUCTIVE SLEEP APNEA, ADULT: Primary | ICD-10-CM

## 2024-09-25 PROCEDURE — 95810 POLYSOM 6/> YRS 4/> PARAM: CPT | Performed by: FAMILY MEDICINE

## 2024-12-20 ENCOUNTER — OFFICE VISIT (OUTPATIENT)
Dept: SLEEP MEDICINE | Facility: HOSPITAL | Age: 54
End: 2024-12-20
Payer: COMMERCIAL

## 2024-12-20 VITALS
HEART RATE: 71 BPM | WEIGHT: 241.8 LBS | HEIGHT: 63 IN | BODY MASS INDEX: 42.84 KG/M2 | SYSTOLIC BLOOD PRESSURE: 136 MMHG | OXYGEN SATURATION: 96 % | DIASTOLIC BLOOD PRESSURE: 70 MMHG

## 2024-12-20 DIAGNOSIS — E66.813 CLASS 3 SEVERE OBESITY WITH SERIOUS COMORBIDITY AND BODY MASS INDEX (BMI) OF 40.0 TO 44.9 IN ADULT, UNSPECIFIED OBESITY TYPE: ICD-10-CM

## 2024-12-20 DIAGNOSIS — K21.9 GASTROESOPHAGEAL REFLUX DISEASE, UNSPECIFIED WHETHER ESOPHAGITIS PRESENT: ICD-10-CM

## 2024-12-20 DIAGNOSIS — G47.33 OBSTRUCTIVE SLEEP APNEA, ADULT: Primary | ICD-10-CM

## 2024-12-20 DIAGNOSIS — E66.01 CLASS 3 SEVERE OBESITY WITH SERIOUS COMORBIDITY AND BODY MASS INDEX (BMI) OF 40.0 TO 44.9 IN ADULT, UNSPECIFIED OBESITY TYPE: ICD-10-CM

## 2024-12-20 PROCEDURE — G0463 HOSPITAL OUTPT CLINIC VISIT: HCPCS

## 2024-12-20 RX ORDER — IBUPROFEN 600 MG/1
600 TABLET, FILM COATED ORAL AS NEEDED
COMMUNITY
Start: 2024-10-10

## 2024-12-20 NOTE — PROGRESS NOTES
[address] [place] [id number]  Phone: [phone]  Fax: [phone]      SLEEP CLINIC FOLLOW UP PROGRESS NOTE.    Livia Zaman  [id number]   1970  54 y.o.  female      PCP: Alva Russell MD      Date of visit: 12/20/2024    Chief Complaint   Patient presents with    Sleep Apnea       Medications and allergies are reviewed by me and documented in the encounter.     SOCIAL (habits pertaining to sleep medicine)  History tobacco use:No  History of alcohol use: 0 per week  Caffeine use: 3 beverages/d    HPI:  This is a 54 y.o. PMHx GERD. Here for management of obstructive sleep apnea (overall AHI of 16.4/hr/supine 23.7/hr and REM AHI 35.8/hr on 9/24/2024 PSG with ETCO2 monitoring that showed no hypoventilation  nor hypoxia completed with me; had a prior PSG with Dr. Meyer  8/26/2024 AHI 3.2/hr), Patient is using positive airway pressure therapy and the symptoms of sleep apnea have improved significantly on the therapy. Normally patient goes to bed at 10-11 PM and wakes up at 6-7 AM .  The patient wakes up 0-1 time(s) during the night and has no problem going back to sleep.  Feels refreshed after waking up.     Overall patient's Impression of their PAP therapy is: Going well overall but could be better  Air pressures ramp up too high difficulty with exhalation   EPR is at 2   Seal on dreamwear FFM breaks when air pressures ramp up    Her  and family told her with PAP she's not snoring as much  She has fallen asleep without PAP and noticed sleepy when falling asleep without PAP   Sleep more restorative not sleepy after wearing PAP    Compliance data as reviewed by me with patient room today:  Date range 11/10/2024 - 12/9/2024  Overall use 87%  4-hour osorio 87%  Average days used 6 hours 33 minutes  Device AirSense 10 AutoSet  Settings 8-20 cm H2O  EPR 2 full-time  95th percentile pressure is 12.9 cm H2O  Maximum pressure is 14.4 cm H2O  95th percentile leak is 11 LPM  AHI is  "2.1  DME: [facility]  Mask used: Dreamwear FFM - leak / seal breaks when pressures ramp up      -GERD   Taking famoidine 20 mg/d   Takes it every night  for past 1 year  States her family does eat late and tends to make it worse   Onions are a known trigger for her  Has not discussed Ppi with her PCP    -Last seen in sleep clinic ~3 months ago 9/16/2024 for review results are PSG.  At that time she stated she was shocked that she did not get diagnosed with sleep apnea.  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  I ordered another PSG for her with ETCO2 monitoring.      REVIEW OF SYSTEMS:   Is negative unless otherwise noted in HPI  [place] Sleepiness Scale :Total score: 10 no near miss or MVC 2/2  sleepiness - falling asleep without PAP s ome nights    Disclaimer History: The above history is based on this sleep physician's in room encounter with the patient. Pre encounter self administered questionnaires are taken into consideration and discussed with patient for any discordance. The above documentation by this sleep physician is the most accurate clinical information determined by in room sleep physician encounter with patient.     PHYSICAL EXAMINATION:  Vitals:    12/20/24 1100   BP: 136/70   Pulse: 71   SpO2: 96%   Weight: 110 kg (241 lb 12.8 oz)   Height: 158.8 cm (62.52\")    Body mass index is 43.49 kg/m².   CONSTITUTIONAL: Well appearing, in no overt pain or respiratory distress   NOSE: No septal defect  RESP SYSTEM:  No overt respiratory distress, speaks in clear sentences without dyspnea, no accessory muscle use  CARDIOVASULAR: No edema noted  NEURO: Oriented x 3, no gross focal deficits         ASSESSMENT AND PLAN:  Obstructive sleep apnea ( G 47.33).    -Specific Changes made by me today:  I. After review of compliance data, in visit clinical correlation, and through shared decision making:     I logged in to [employer] and made her EPR 3 full times.  II.  Counseled do not fall asleep without PAP " to minimize sleepiness.   III.  Order placed for mask fitting with airfit f20 full face mask  IV. Counseled patient to follow-up with me in 4 months for therapy review.  Counseled may request sooner follow-up to sleep clinic anytime the patient feels necessary.  The symptoms of sleep apnea have improved with the device and the treatment.  Patient's compliance with the device is excellent for treatment of sleep apnea.  I have independently reviewed the smart card down load and discussed with the patient the download data and encouarged the patient to continue to use the device.The residual AHI is acceptable. The device is benefiting the patient and the device is medically necessary.  Without proper control of sleep apnea and good compliance there is a increased risk for hypertension, diabetes mellitus and nonrestorative sleep with hypersomnia which can increase risk for motor vehicle accidents.  Untreated sleep apnea is also a risk factor for development of atrial fibrillation, pulmonary hypertension, insulin resistance and stroke. The patient is also instructed to get the supplies from the [employer] and and change them on a regular basis.  A prescription for supplies has been sent to the [facility].  I have also discussed the good sleep hygiene habits and adequate amount of sleep needed for good health.  Obesity, with BMI is Body mass index is 43.49 kg/m².. Counseled weight loss will be beneficial for reduction in severity of sleep apnea, healthy diet/exercise to achieve same, follow up with primary care physician for serial monitoring and to further guide management.  GERD, Counseled follow up with  PCP for considerations of a trial of proton pump inhibitor discussion with her PCP- requested my clinic notes forwarded to PCP listed in Epic. Counseled GERD is a known intrinsic disrupter of sleep lifestyle modifications first line avoid eating near bed time and known food triggers.     Follow up in 4 months .  Patient's questions were answered.        EMR Dragon/Transcription disclaimer:   Much of this encounter note is an electronic transcription/translation of spoken language to printed text. The electronic translation of spoken language may permit erroneous, or at times, nonsensical words or phrases to be inadvertently transcribed; Although I have reviewed the note for such errors, some may still exist.       NPI #: [id number]    Tino Thomas,   Sleep Medicine  [facility]  12/20/24

## 2025-01-09 ENCOUNTER — PREP FOR SURGERY (OUTPATIENT)
Dept: OTHER | Facility: HOSPITAL | Age: 55
End: 2025-01-09
Payer: COMMERCIAL

## 2025-01-09 ENCOUNTER — OFFICE VISIT (OUTPATIENT)
Dept: SURGERY | Facility: CLINIC | Age: 55
End: 2025-01-09
Payer: COMMERCIAL

## 2025-01-09 VITALS
DIASTOLIC BLOOD PRESSURE: 78 MMHG | BODY MASS INDEX: 42.7 KG/M2 | HEART RATE: 80 BPM | WEIGHT: 241 LBS | OXYGEN SATURATION: 97 % | SYSTOLIC BLOOD PRESSURE: 132 MMHG | HEIGHT: 63 IN

## 2025-01-09 DIAGNOSIS — Z12.11 SCREENING FOR MALIGNANT NEOPLASM OF COLON: Primary | ICD-10-CM

## 2025-01-09 DIAGNOSIS — Z86.0100 HISTORY OF COLONIC POLYPS: ICD-10-CM

## 2025-01-09 RX ORDER — SODIUM CHLORIDE 0.9 % (FLUSH) 0.9 %
3 SYRINGE (ML) INJECTION EVERY 12 HOURS SCHEDULED
OUTPATIENT
Start: 2025-01-09

## 2025-01-09 RX ORDER — SODIUM CHLORIDE 0.9 % (FLUSH) 0.9 %
10 SYRINGE (ML) INJECTION AS NEEDED
OUTPATIENT
Start: 2025-01-09

## 2025-01-09 RX ORDER — SODIUM CHLORIDE 9 MG/ML
40 INJECTION, SOLUTION INTRAVENOUS AS NEEDED
OUTPATIENT
Start: 2025-01-09

## 2025-01-09 NOTE — PROGRESS NOTES
Chief Complaint: Colonoscopy    Subjective      Colonoscopy consultation       History of Present Illness  Livia Zaman is a 54 y.o. female presents to [facility] for colonoscopy consultation.    Patient presents today without complaints for screening colonoscopy.  She denies any abdominal pain, change in bowel habit, or rectal bleeding.  Denies any family history of colorectal cancer.  Admits to history of colonic polyps    Patient does have a history of a neuro and her kidney and pancreatic mass.    Admits to ISHA.  Does use a CPAP.    Denies any cardiac issues.  Denies taking any GLP-1 receptors.    : Colonoscopy (Vishnu): Descending - tubular adenoma; diverticulosis.       Objective     Past Medical History:   Diagnosis Date    Allergic rhinitis     Anxiety     Arrhythmia 2015    PVC's    Cancer     PNET    Cholelithiasis     Removed     Colon polyp 2021    Dental disease large soft palate, large tongue    Dr Perez, Orthodontist    Depression     Diverticulitis of colon     Diverticulosis 2021    GERD (gastroesophageal reflux disease) last couple of years    Headache As a teen    Hypothyroidism 2019    Insomnia     Migraine     Obesity     Pancreatic cancer     Neuroendocrine Tumor    Pancreatic lesion 2019    PVC (premature ventricular contraction)     Sleep apnea 2018    On cpap       Past Surgical History:   Procedure Laterality Date    BREAST BIOPSY  2015    Left     SECTION      CHOLECYSTECTOMY      COLONOSCOPY N/A 12/15/2021    Procedure: COLONOSCOPY  with hot snare polypectomy;  Surgeon: Umang Mares MD;  Location: [facility] ENDOSCOPY;  Service: General;  Laterality: N/A;  diverticlosis, colon polyp, endo clip x1    HYSTERECTOMY  10/10/2024    LIPOMA EXCISION Right     Right shoulder    LIVER BIOPSY      laproscopic       Outpatient Medications Marked as Taking for the 25 encounter (Office Visit) with Naman  April, APRN   Medication Sig Dispense Refill    Cetirizine HCl (ZyrTEC Allergy) 10 MG capsule Daily.      escitalopram (LEXAPRO) 20 MG tablet Take 1 tablet by mouth Daily.      famotidine (PEPCID) 20 MG tablet Take 1 tablet by mouth 2 (Two) Times a Day As Needed.      ibuprofen (ADVIL,MOTRIN) 600 MG tablet Take 1 tablet by mouth As Needed.      LORazepam (ATIVAN) 1 MG tablet TAKE 1/2 TABLET BY MOUTH TWICE DAILY AND TAKE 1 TABLET BY MOUTH AT BEDTIME AS NEEDED      metoprolol succinate XL (TOPROL-XL) 25 MG 24 hr tablet Take 1 tablet by mouth Daily. 90 tablet 3    Synthroid 137 MCG tablet       vitamin D (ERGOCALCIFEROL) 1.25 MG (43332 UT) capsule capsule          Allergies   Allergen Reactions    Desvenlafaxine Rash        Family History   Problem Relation Age of Onset    Hypertension Mother     Kidney disease Mother     Thyroid disease Mother     Migraines Mother     Restless legs syndrome Mother     Heart disease Father     Prostate cancer Father     Skin cancer Father     Cancer Father         Prostate/bone ca, melanoma    COPD Father     Arrhythmia Father         A fib, had ablation    Heart attack Father     Asthma Father     Hyperlipidemia Father     Insomnia Father     Early death Sister         Hit by drunk  1988    Hypertension Maternal Grandmother     Thyroid disease Maternal Grandmother     Heart disease Maternal Grandfather     Hypertension Maternal Grandfather     Prostate cancer Maternal Grandfather     Cancer Maternal Grandfather         Esophageal Ca    Vision loss Maternal Grandfather     Heart failure Maternal Grandfather     Heart disease Paternal Grandmother     Heart disease Paternal Grandfather     Hypertension Other         AUNT    Skin cancer Other         UNCLE    Malig Hyperthermia Neg Hx        Social History     Socioeconomic History    Marital status:    Tobacco Use    Smoking status: Never    Smokeless tobacco: Never   Vaping Use    Vaping status: Never Used   Substance and  "Sexual Activity    Alcohol use: Yes     Alcohol/week: 1.0 standard drink of alcohol     Types: 1 Glasses of wine per week     Comment: 3-4 times per year    Drug use: Never    Sexual activity: Yes     Partners: Male     Birth control/protection: Post-menopausal, Hysterectomy     Comment: Postmenopausal       Review of Systems   Constitutional:  Negative for chills and fever.   Gastrointestinal:  Negative for abdominal distention, abdominal pain, anal bleeding, blood in stool, constipation, diarrhea and rectal pain.        Vital Signs:   /78 (BP Location: Left arm, Patient Position: Sitting, Cuff Size: Large Adult)   Pulse 80   Ht 158.8 cm (62.52\")   Wt 109 kg (241 lb)   SpO2 97%   BMI 43.35 kg/m²      Physical Exam  Vitals and nursing note reviewed.   Constitutional:       General: She is not in acute distress.     Appearance: Normal appearance. She is not ill-appearing.   HENT:      Head: Normocephalic and atraumatic.   Cardiovascular:      Rate and Rhythm: Normal rate.   Pulmonary:      Effort: Pulmonary effort is normal.      Breath sounds: No stridor.   Abdominal:      Palpations: Abdomen is soft.      Tenderness: There is no guarding.   Musculoskeletal:         General: No deformity. Normal range of motion.   Skin:     General: Skin is warm and dry.      Coloration: Skin is not jaundiced.   Neurological:      General: No focal deficit present.      Mental Status: She is alert and oriented to person, place, and time.   Psychiatric:         Mood and Affect: Mood normal.         Thought Content: Thought content normal.          Result Review :          []  Laboratory  []  Radiology  []  Pathology  []  Microbiology  []  EKG/Telemetry   []  Cardiology/Vascular   []  Old records  I spent 15 minutes caring for Livia on this date of service. This time includes time spent by me in the following activities: reviewing tests, obtaining and/or reviewing a separately obtained history, performing a medically " appropriate examination and/or evaluation, ordering medications, tests, or procedures, and documenting information in the medical record        Assessment and Plan    Diagnoses and all orders for this visit:    1. Screening for malignant neoplasm of colon (Primary)    2. History of colonic polyps    Other orders  -     polyethylene glycol (MIRALAX) 17 g packet; Take as directed.  Instructions given in office.  Dispense: 238 g bottle  Dispense: 238 packet; Refill: 0        Follow Up   Return for Schedule colonoscopy with Dr. Mares on 2/26/[facility]25 [facility].    Hospital arrival time: 0930    Possible risks/complications, benefits, and alternatives to surgical or invasive procedures have been explained to patient and/or legal guardian.    Patient has been evaluated and can tolerate anesthesia and/or sedation. Risks, benefits, and alternatives to anesthesia and sedation have been explained to the patient and/or legal guardian. Patient verbalizes understanding and is willing to proceed with the above plan.     Patient was given instructions and counseling regarding her condition or for health maintenance advice. Please see specific information pulled into the AVS if appropriate.     Thank you for allowing me to participate in the care of this patient. Please call with questions or concerns.

## 2025-01-10 RX ORDER — POLYETHYLENE GLYCOL 3350 17 G/17G
POWDER, FOR SOLUTION ORAL
Qty: 238 PACKET | Refills: 0 | Status: SHIPPED | OUTPATIENT
Start: 2025-01-10

## 2025-02-05 ENCOUNTER — OFFICE VISIT (OUTPATIENT)
Dept: CARDIOLOGY | Facility: CLINIC | Age: 55
End: 2025-02-05
Payer: COMMERCIAL

## 2025-02-05 VITALS
HEART RATE: 59 BPM | WEIGHT: 245.4 LBS | HEIGHT: 63 IN | DIASTOLIC BLOOD PRESSURE: 83 MMHG | BODY MASS INDEX: 43.48 KG/M2 | SYSTOLIC BLOOD PRESSURE: 122 MMHG

## 2025-02-05 DIAGNOSIS — I49.3 PVC (PREMATURE VENTRICULAR CONTRACTION): Primary | ICD-10-CM

## 2025-02-05 PROBLEM — R16.0 LIVER MASS: Status: ACTIVE | Noted: 2024-01-22

## 2025-02-05 PROBLEM — E03.9 HYPOTHYROIDISM: Status: RESOLVED | Noted: 2019-03-08 | Resolved: 2025-02-05

## 2025-02-05 PROCEDURE — 99213 OFFICE O/P EST LOW 20 MIN: CPT | Performed by: NURSE PRACTITIONER

## 2025-02-05 PROCEDURE — 93000 ELECTROCARDIOGRAM COMPLETE: CPT | Performed by: NURSE PRACTITIONER

## 2025-02-05 RX ORDER — METOPROLOL SUCCINATE 25 MG/1
25 TABLET, EXTENDED RELEASE ORAL DAILY
Qty: 90 TABLET | Refills: 3 | Status: SHIPPED | OUTPATIENT
Start: 2025-02-05

## 2025-02-05 NOTE — H&P (VIEW-ONLY)
Chief Complaint  Follow-up and PVC    Subjective            History of Present Illness  Livia Zaman is a 54-year-old female patient who presents to the office today for follow-up.  She has PVCs for which she is prescribed metoprolol 25 mg daily.  She is compliant with medication.  She denies any new or worsening cardiac symptoms today.    PMH  Past Medical History:   Diagnosis Date    Allergic rhinitis     Anxiety     Arrhythmia 2015    PVC's    Cancer     PNET    Cholelithiasis     Removed     Colon polyp 2021    Dental disease large soft palate, large tongue    Dr Perez, Orthodontist    Depression     Diverticulitis of colon     Diverticulosis 2021    GERD (gastroesophageal reflux disease) last couple of years    Headache As a teen    Hypothyroidism 2019    Insomnia     Migraine     Obesity     Pancreatic cancer     Neuroendocrine Tumor    Pancreatic lesion 2019    PVC (premature ventricular contraction)     Sleep apnea 2018    On cpap         ALLERGY  Allergies   Allergen Reactions    Desvenlafaxine Rash          SURGICALHX  Past Surgical History:   Procedure Laterality Date    BREAST BIOPSY      Left     SECTION      CHOLECYSTECTOMY      COLONOSCOPY N/A 12/15/2021    Procedure: COLONOSCOPY  with hot snare polypectomy;  Surgeon: Umang Mares MD;  Location: [facility] ENDOSCOPY;  Service: General;  Laterality: N/A;  diverticlosis, colon polyp, endo clip x1    HYSTERECTOMY  10/10/2024    LIPOMA EXCISION Right     Right shoulder    LIVER BIOPSY      laproscopic          SOC  Social History     Socioeconomic History    Marital status:    Tobacco Use    Smoking status: Never    Smokeless tobacco: Never   Vaping Use    Vaping status: Never Used   Substance and Sexual Activity    Alcohol use: Yes     Alcohol/week: 1.0 standard drink of alcohol     Types: 1 Glasses of wine per week     Comment: 3-4 times per year    Drug use: Never    Sexual activity: Yes      Partners: Male     Birth control/protection: Post-menopausal, Hysterectomy     Comment: Postmenopausal         FAMHX  Family History   Problem Relation Age of Onset    Hypertension Mother     Kidney disease Mother     Thyroid disease Mother     Migraines Mother     Restless legs syndrome Mother     Heart disease Father     Prostate cancer Father     Skin cancer Father     Cancer Father         Prostate/bone ca, melanoma    COPD Father     Arrhythmia Father         A fib, had ablation    Heart attack Father     Asthma Father     Hyperlipidemia Father     Insomnia Father     Early death Sister         Hit by drunk  1988    Hypertension Maternal Grandmother     Thyroid disease Maternal Grandmother     Heart disease Maternal Grandfather     Hypertension Maternal Grandfather     Prostate cancer Maternal Grandfather     Cancer Maternal Grandfather         Esophageal Ca    Vision loss Maternal Grandfather     Heart failure Maternal Grandfather     Heart disease Paternal Grandmother     Heart disease Paternal Grandfather     Hypertension Other         AUNT    Skin cancer Other         UNCLE    Malig Hyperthermia Neg Hx           MEDSIGONLY  Current Outpatient Medications on File Prior to Visit   Medication Sig    Cetirizine HCl (ZyrTEC Allergy) 10 MG capsule Daily.    escitalopram (LEXAPRO) 20 MG tablet Take 1 tablet by mouth Daily.    famotidine (PEPCID) 20 MG tablet Take 1 tablet by mouth 2 (Two) Times a Day As Needed.    ibuprofen (ADVIL,MOTRIN) 600 MG tablet Take 1 tablet by mouth As Needed.    LORazepam (ATIVAN) 1 MG tablet TAKE 1/2 TABLET BY MOUTH TWICE DAILY AND TAKE 1 TABLET BY MOUTH AT BEDTIME AS NEEDED    metoprolol succinate XL (TOPROL-XL) 25 MG 24 hr tablet Take 1 tablet by mouth Daily.    Synthroid 137 MCG tablet     vitamin D (ERGOCALCIFEROL) 1.25 MG (08762 UT) capsule capsule     polyethylene glycol (MIRALAX) 17 g packet Take as directed.  Instructions given in office.  Dispense: 238 g bottle  "(Patient not taking: Reported on 2/5/2025)     No current facility-administered medications on file prior to visit.         Objective   /83   Pulse 59   Ht 158.8 cm (62.52\")   Wt 111 kg (245 lb 6.4 oz)   BMI 44.14 kg/m²       Physical Exam  Constitutional:       Appearance: She is obese.   HENT:      Head: Normocephalic.   Neck:      Vascular: No carotid bruit.   Cardiovascular:      Rate and Rhythm: Normal rate and regular rhythm.      Pulses: Normal pulses.      Heart sounds: Normal heart sounds. No murmur heard.  Pulmonary:      Effort: Pulmonary effort is normal.      Breath sounds: Normal breath sounds.   Musculoskeletal:      Cervical back: Neck supple.      Right lower leg: No edema.      Left lower leg: No edema.   Skin:     General: Skin is dry.   Neurological:      Mental Status: She is alert and oriented to person, place, and time.   Psychiatric:         Behavior: Behavior normal.       ECG 12 Lead    Date/Time: 2/5/2025 8:24 AM  Performed by: Francisca Seo APRN    Authorized by: Francisca Seo APRN  Comparison: compared with previous ECG from 2/1/2023  Similar to previous ECG  Rhythm: sinus rhythm  Rate: normal  BPM: 64  Conduction: conduction normal  ST Segments: ST segments normal  T Waves: T waves normal  QRS axis: normal  Other findings: low voltage    Clinical impression: normal ECG      Result Review :   The following data was reviewed by: JENARO Harmon on 02/05/2025:  No results found for: \"PROBNP\"    CBC w/diff          4/2/2024    10:29   CBC w/Diff   WBC 7.12       RBC 4.62       Hemoglobin 13.6       Hematocrit 40.7       MCV 88.1       MCH 29.4       MCHC 33.4       RDW 12.4       Platelets 288       Neutrophil Rel % 58.5       Immature Granulocyte Rel % 0.1       Lymphocyte Rel % 30.9       Monocyte Rel % 5.3       Eosinophil Rel % 4.1       Basophil Rel % 1.1         Lab Results   Component Value Date    TSH 3.430 05/23/2023      Lab Results   Component " "Value Date    FREET4 0.91 (L) 05/23/2023      No results found for: \"DDIMERQUANT\"  Magnesium   Date Value Ref Range Status   12/22/2020 1.91 1.60 - 2.30 mg/dL Final      No results found for: \"DIGOXIN\"   No results found for: \"TROPONINT\"        Results for orders placed in visit on 02/18/22    Adult Transthoracic Echo Complete W/ Cont if Necessary Per Protocol    Interpretation Summary  · Calculated left ventricular EF = 60% Estimated left ventricular EF was in agreement with the calculated left ventricular EF.           Assessment and Plan    Diagnoses and all orders for this visit:    1. PVC (premature ventricular contraction) (Primary)  Symptomatically stable at this time, rate controlled, continue metoprolol 25 mg daily.  -     metoprolol succinate XL (TOPROL-XL) 25 MG 24 hr tablet; Take 1 tablet by mouth Daily.  Dispense: 90 tablet; Refill: 3    Other orders  -     ECG 12 Lead            Follow Up   Return in about 1 year (around 2/5/2026) for Follow up with Dr Muñoz.    Patient was given instructions and counseling regarding her condition or for health maintenance advice. Please see specific information pulled into the AVS if appropriate.     Livia Zaman  reports that she has never smoked. She has never used smokeless tobacco.          Francisca Seo, JENARO  02/05/25  08:06 EST    Dictated Utilizing Dragon Dictation    "

## 2025-02-05 NOTE — PROGRESS NOTES
Chief Complaint  Follow-up and PVC    Subjective            History of Present Illness  Livia Zaman is a 54-year-old female patient who presents to the office today for follow-up.  She has PVCs for which she is prescribed metoprolol 25 mg daily.  She is compliant with medication.  She denies any new or worsening cardiac symptoms today.    PMH  Past Medical History:   Diagnosis Date    Allergic rhinitis     Anxiety     Arrhythmia 2015    PVC's    Cancer     PNET    Cholelithiasis     Removed     Colon polyp 2021    Dental disease large soft palate, large tongue    Dr Perez, Orthodontist    Depression     Diverticulitis of colon     Diverticulosis 2021    GERD (gastroesophageal reflux disease) last couple of years    Headache As a teen    Hypothyroidism 2019    Insomnia     Migraine     Obesity     Pancreatic cancer     Neuroendocrine Tumor    Pancreatic lesion 2019    PVC (premature ventricular contraction)     Sleep apnea 2018    On cpap         ALLERGY  Allergies   Allergen Reactions    Desvenlafaxine Rash          SURGICALHX  Past Surgical History:   Procedure Laterality Date    BREAST BIOPSY      Left     SECTION      CHOLECYSTECTOMY      COLONOSCOPY N/A 12/15/2021    Procedure: COLONOSCOPY  with hot snare polypectomy;  Surgeon: Umang Mares MD;  Location: [facility] ENDOSCOPY;  Service: General;  Laterality: N/A;  diverticlosis, colon polyp, endo clip x1    HYSTERECTOMY  10/10/2024    LIPOMA EXCISION Right     Right shoulder    LIVER BIOPSY      laproscopic          SOC  Social History     Socioeconomic History    Marital status:    Tobacco Use    Smoking status: Never    Smokeless tobacco: Never   Vaping Use    Vaping status: Never Used   Substance and Sexual Activity    Alcohol use: Yes     Alcohol/week: 1.0 standard drink of alcohol     Types: 1 Glasses of wine per week     Comment: 3-4 times per year    Drug use: Never    Sexual activity: Yes  LSW made follow up phone call to patient's mother Eladia Arguelles. Has not called [facility] yet that LSW referred mother to on 8/16/23. LSW encouraged mother to make call for assistance from navigator with obtaining resources and services for patient's special needs, mother stated intent to follow through. Conversation with mother was somewhat chaotic as LSW could hear throughout call several children in background with mother loudly giving orders to children and reprimanding them. LSW made conference call with mother to [facility] de[facility], [phone], only one urologist goes to [facility], scheduled first available appt for Monday, 9/25/23 at 12:45am with Dr. Mildred Hidalgo, [facility], [facility], first floor, [address]. Mother asked if LSW could write down appts to send to her. LSW offered to check patient's siblings' charts for any follow up to include with other appts. LSW also offered to schedule appt with [facility], mother appreciative, stated she's got a lot going on right now, no specific dates or times, preferred early afternoon. No other social work needs at this time.     Partners: Male     Birth control/protection: Post-menopausal, Hysterectomy     Comment: Postmenopausal         FAMHX  Family History   Problem Relation Age of Onset    Hypertension Mother     Kidney disease Mother     Thyroid disease Mother     Migraines Mother     Restless legs syndrome Mother     Heart disease Father     Prostate cancer Father     Skin cancer Father     Cancer Father         Prostate/bone ca, melanoma    COPD Father     Arrhythmia Father         A fib, had ablation    Heart attack Father     Asthma Father     Hyperlipidemia Father     Insomnia Father     Early death Sister         Hit by drunk  1988    Hypertension Maternal Grandmother     Thyroid disease Maternal Grandmother     Heart disease Maternal Grandfather     Hypertension Maternal Grandfather     Prostate cancer Maternal Grandfather     Cancer Maternal Grandfather         Esophageal Ca    Vision loss Maternal Grandfather     Heart failure Maternal Grandfather     Heart disease Paternal Grandmother     Heart disease Paternal Grandfather     Hypertension Other         AUNT    Skin cancer Other         UNCLE    Malig Hyperthermia Neg Hx           MEDSIGONLY  Current Outpatient Medications on File Prior to Visit   Medication Sig    Cetirizine HCl (ZyrTEC Allergy) 10 MG capsule Daily.    escitalopram (LEXAPRO) 20 MG tablet Take 1 tablet by mouth Daily.    famotidine (PEPCID) 20 MG tablet Take 1 tablet by mouth 2 (Two) Times a Day As Needed.    ibuprofen (ADVIL,MOTRIN) 600 MG tablet Take 1 tablet by mouth As Needed.    LORazepam (ATIVAN) 1 MG tablet TAKE 1/2 TABLET BY MOUTH TWICE DAILY AND TAKE 1 TABLET BY MOUTH AT BEDTIME AS NEEDED    metoprolol succinate XL (TOPROL-XL) 25 MG 24 hr tablet Take 1 tablet by mouth Daily.    Synthroid 137 MCG tablet     vitamin D (ERGOCALCIFEROL) 1.25 MG (69341 UT) capsule capsule     polyethylene glycol (MIRALAX) 17 g packet Take as directed.  Instructions given in office.  Dispense: 238 g bottle  "(Patient not taking: Reported on 2/5/2025)     No current facility-administered medications on file prior to visit.         Objective   /83   Pulse 59   Ht 158.8 cm (62.52\")   Wt 111 kg (245 lb 6.4 oz)   BMI 44.14 kg/m²       Physical Exam  Constitutional:       Appearance: She is obese.   HENT:      Head: Normocephalic.   Neck:      Vascular: No carotid bruit.   Cardiovascular:      Rate and Rhythm: Normal rate and regular rhythm.      Pulses: Normal pulses.      Heart sounds: Normal heart sounds. No murmur heard.  Pulmonary:      Effort: Pulmonary effort is normal.      Breath sounds: Normal breath sounds.   Musculoskeletal:      Cervical back: Neck supple.      Right lower leg: No edema.      Left lower leg: No edema.   Skin:     General: Skin is dry.   Neurological:      Mental Status: She is alert and oriented to person, place, and time.   Psychiatric:         Behavior: Behavior normal.       ECG 12 Lead    Date/Time: 2/5/2025 8:24 AM  Performed by: Francisca Seo APRN    Authorized by: Francisca Seo APRN  Comparison: compared with previous ECG from 2/1/2023  Similar to previous ECG  Rhythm: sinus rhythm  Rate: normal  BPM: 64  Conduction: conduction normal  ST Segments: ST segments normal  T Waves: T waves normal  QRS axis: normal  Other findings: low voltage    Clinical impression: normal ECG      Result Review :   The following data was reviewed by: JENARO Harmon on 02/05/2025:  No results found for: \"PROBNP\"    CBC w/diff          4/2/2024    10:29   CBC w/Diff   WBC 7.12       RBC 4.62       Hemoglobin 13.6       Hematocrit 40.7       MCV 88.1       MCH 29.4       MCHC 33.4       RDW 12.4       Platelets 288       Neutrophil Rel % 58.5       Immature Granulocyte Rel % 0.1       Lymphocyte Rel % 30.9       Monocyte Rel % 5.3       Eosinophil Rel % 4.1       Basophil Rel % 1.1         Lab Results   Component Value Date    TSH 3.430 05/23/2023      Lab Results   Component " "Value Date    FREET4 0.91 (L) 05/23/2023      No results found for: \"DDIMERQUANT\"  Magnesium   Date Value Ref Range Status   12/22/2020 1.91 1.60 - 2.30 mg/dL Final      No results found for: \"DIGOXIN\"   No results found for: \"TROPONINT\"        Results for orders placed in visit on 02/18/22    Adult Transthoracic Echo Complete W/ Cont if Necessary Per Protocol    Interpretation Summary  · Calculated left ventricular EF = 60% Estimated left ventricular EF was in agreement with the calculated left ventricular EF.           Assessment and Plan    Diagnoses and all orders for this visit:    1. PVC (premature ventricular contraction) (Primary)  Symptomatically stable at this time, rate controlled, continue metoprolol 25 mg daily.  -     metoprolol succinate XL (TOPROL-XL) 25 MG 24 hr tablet; Take 1 tablet by mouth Daily.  Dispense: 90 tablet; Refill: 3    Other orders  -     ECG 12 Lead            Follow Up   Return in about 1 year (around 2/5/2026) for Follow up with Dr Muñoz.    Patient was given instructions and counseling regarding her condition or for health maintenance advice. Please see specific information pulled into the AVS if appropriate.     Livia Zaman  reports that she has never smoked. She has never used smokeless tobacco.          Francisca Seo, JENARO  02/05/25  08:06 EST    Dictated Utilizing Dragon Dictation    "

## 2025-02-20 NOTE — PRE-PROCEDURE INSTRUCTIONS
Reminded of arrival time at 1030 , Entrance C of the [facility].   Instructed to bring picture ID and Insurance Card. Have a  over the age of 18 to drive you home the day of procedure.      Clear liquid diet the day before the procedure, nothing red or purple. Call MD office with any questions about the prep or if in need of the prep    Nothing by mouth after midnight the night before the procedure or the AM of the procedure with the exception of your bowel prep and medication. Bowel Prep must be completed 4 hours prior to arrival and medication must be taken with a sip of water 2 hours prior to arrival time.    Meds to take AM of Procedure- no am meds takes metoprolol at night

## 2025-02-24 ENCOUNTER — ANESTHESIA EVENT (OUTPATIENT)
Dept: GASTROENTEROLOGY | Facility: HOSPITAL | Age: 55
End: 2025-02-24
Payer: COMMERCIAL

## 2025-02-24 NOTE — ANESTHESIA PREPROCEDURE EVALUATION
Anesthesia Evaluation     Patient summary reviewed and Nursing notes reviewed   NPO Solid Status: > 8 hours  NPO Liquid Status: > 6 hours           Airway   Mallampati: I  TM distance: >3 FB  Neck ROM: full  No difficulty expected  Dental - normal exam     Pulmonary     breath sounds clear to auscultation  (+) ,sleep apnea on CPAP  Cardiovascular   Exercise tolerance: good (4-7 METS)    ECG reviewed  Patient on routine beta blocker  Rhythm: regular  Rate: normal      ROS comment: Taking metoprolol - last dose 02/25/25        Neuro/Psych  (+) headaches, psychiatric history Anxiety and Depression  GI/Hepatic/Renal/Endo    (+) obesity, morbid obesity, GERD well controlled, thyroid problem hypothyroidism    Musculoskeletal     Abdominal    Substance History      OB/GYN          Other      history of cancer    ROS/Med Hx Other: Hx pancreatic CA    Screening, hx polyps     EKG 02/05/25: HR 64, SR    ECHO 02/18/22:   Calculated left ventricular EF = 60% Estimated left ventricular EF was in agreement with the calculated left ventricular EF.    S/p hysterectomy                  Anesthesia Plan    ASA 3     general   total IV anesthesia  (Total IV Anesthesia    Patient understands anesthesia not responsible for dental damage.      Discussed risks with pt including aspiration, allergic reactions, apnea, advanced airway placement. Pt verbalized understanding. All questions answered.     )  intravenous induction     Anesthetic plan, risks, benefits, and alternatives have been provided, discussed and informed consent has been obtained with: patient and spouse/significant other.  Pre-procedure education provided  Plan discussed with CRNA.      CODE STATUS:

## 2025-02-26 ENCOUNTER — HOSPITAL ENCOUNTER (OUTPATIENT)
Facility: HOSPITAL | Age: 55
Setting detail: HOSPITAL OUTPATIENT SURGERY
Discharge: HOME | End: 2025-02-26
Attending: SURGERY | Admitting: SURGERY
Payer: COMMERCIAL

## 2025-02-26 ENCOUNTER — APPOINTMENT (OUTPATIENT)
Dept: GENERAL RADIOLOGY | Facility: HOSPITAL | Age: 55
End: 2025-02-26
Payer: COMMERCIAL

## 2025-02-26 ENCOUNTER — PREP FOR SURGERY (OUTPATIENT)
Dept: OTHER | Facility: HOSPITAL | Age: 55
End: 2025-02-26
Payer: COMMERCIAL

## 2025-02-26 ENCOUNTER — ANESTHESIA (OUTPATIENT)
Dept: GASTROENTEROLOGY | Facility: HOSPITAL | Age: 55
End: 2025-02-26
Payer: COMMERCIAL

## 2025-02-26 VITALS
SYSTOLIC BLOOD PRESSURE: 104 MMHG | TEMPERATURE: 97.2 F | OXYGEN SATURATION: 94 % | DIASTOLIC BLOOD PRESSURE: 55 MMHG | BODY MASS INDEX: 43.62 KG/M2 | RESPIRATION RATE: 20 BRPM | HEART RATE: 101 BPM | WEIGHT: 242.51 LBS

## 2025-02-26 DIAGNOSIS — Z86.0100 HISTORY OF COLONIC POLYPS: Primary | ICD-10-CM

## 2025-02-26 DIAGNOSIS — Z12.11 SCREENING FOR MALIGNANT NEOPLASM OF COLON: ICD-10-CM

## 2025-02-26 DIAGNOSIS — Z86.0100 HISTORY OF COLONIC POLYPS: ICD-10-CM

## 2025-02-26 PROCEDURE — 25810000003 LACTATED RINGERS PER 1000 ML

## 2025-02-26 PROCEDURE — 71045 X-RAY EXAM CHEST 1 VIEW: CPT

## 2025-02-26 PROCEDURE — 25010000002 LIDOCAINE PF 2% 2 % SOLUTION: Performed by: NURSE ANESTHETIST, CERTIFIED REGISTERED

## 2025-02-26 PROCEDURE — 25010000002 ONDANSETRON PER 1 MG

## 2025-02-26 PROCEDURE — 25010000002 PROPOFOL 10 MG/ML EMULSION: Performed by: NURSE ANESTHETIST, CERTIFIED REGISTERED

## 2025-02-26 RX ORDER — ALBUTEROL SULFATE 0.83 MG/ML
SOLUTION RESPIRATORY (INHALATION)
Status: COMPLETED
Start: 2025-02-26 | End: 2025-02-26

## 2025-02-26 RX ORDER — LIDOCAINE HYDROCHLORIDE 20 MG/ML
INJECTION, SOLUTION EPIDURAL; INFILTRATION; INTRACAUDAL; PERINEURAL AS NEEDED
Status: DISCONTINUED | OUTPATIENT
Start: 2025-02-26 | End: 2025-02-26 | Stop reason: SURG

## 2025-02-26 RX ORDER — SODIUM CHLORIDE 9 MG/ML
40 INJECTION, SOLUTION INTRAVENOUS AS NEEDED
Status: DISCONTINUED | OUTPATIENT
Start: 2025-02-26 | End: 2025-02-26 | Stop reason: HOSPADM

## 2025-02-26 RX ORDER — SODIUM CHLORIDE 0.9 % (FLUSH) 0.9 %
10 SYRINGE (ML) INJECTION AS NEEDED
Status: DISCONTINUED | OUTPATIENT
Start: 2025-02-26 | End: 2025-02-26 | Stop reason: HOSPADM

## 2025-02-26 RX ORDER — ONDANSETRON 2 MG/ML
4 INJECTION INTRAMUSCULAR; INTRAVENOUS ONCE
Status: DISCONTINUED | OUTPATIENT
Start: 2025-02-26 | End: 2025-02-26 | Stop reason: HOSPADM

## 2025-02-26 RX ORDER — PROPOFOL 10 MG/ML
VIAL (ML) INTRAVENOUS AS NEEDED
Status: DISCONTINUED | OUTPATIENT
Start: 2025-02-26 | End: 2025-02-26 | Stop reason: SURG

## 2025-02-26 RX ORDER — SODIUM CHLORIDE, SODIUM LACTATE, POTASSIUM CHLORIDE, CALCIUM CHLORIDE 600; 310; 30; 20 MG/100ML; MG/100ML; MG/100ML; MG/100ML
30 INJECTION, SOLUTION INTRAVENOUS CONTINUOUS
Status: DISCONTINUED | OUTPATIENT
Start: 2025-02-26 | End: 2025-02-26 | Stop reason: HOSPADM

## 2025-02-26 RX ORDER — SODIUM CHLORIDE 0.9 % (FLUSH) 0.9 %
3 SYRINGE (ML) INJECTION EVERY 12 HOURS SCHEDULED
Status: DISCONTINUED | OUTPATIENT
Start: 2025-02-26 | End: 2025-02-26 | Stop reason: HOSPADM

## 2025-02-26 RX ORDER — IPRATROPIUM BROMIDE AND ALBUTEROL SULFATE 2.5; .5 MG/3ML; MG/3ML
3 SOLUTION RESPIRATORY (INHALATION) ONCE
Status: COMPLETED | OUTPATIENT
Start: 2025-02-26 | End: 2025-02-26

## 2025-02-26 RX ORDER — ALBUTEROL SULFATE 0.83 MG/ML
2.5 SOLUTION RESPIRATORY (INHALATION) ONCE
Status: DISCONTINUED | OUTPATIENT
Start: 2025-02-26 | End: 2025-02-26 | Stop reason: HOSPADM

## 2025-02-26 RX ORDER — IPRATROPIUM BROMIDE AND ALBUTEROL SULFATE 2.5; .5 MG/3ML; MG/3ML
3 SOLUTION RESPIRATORY (INHALATION) ONCE
Status: DISCONTINUED | OUTPATIENT
Start: 2025-02-26 | End: 2025-02-26 | Stop reason: HOSPADM

## 2025-02-26 RX ORDER — ONDANSETRON 2 MG/ML
INJECTION INTRAMUSCULAR; INTRAVENOUS
Status: COMPLETED
Start: 2025-02-26 | End: 2025-02-26

## 2025-02-26 RX ADMIN — ALBUTEROL SULFATE 2.5 MG: 2.5 SOLUTION RESPIRATORY (INHALATION) at 13:40

## 2025-02-26 RX ADMIN — LIDOCAINE HYDROCHLORIDE 40 MG: 20 INJECTION, SOLUTION INTRAVENOUS at 11:40

## 2025-02-26 RX ADMIN — PROPOFOL 250 MCG/KG/MIN: 10 INJECTION, EMULSION INTRAVENOUS at 11:40

## 2025-02-26 RX ADMIN — ONDANSETRON 4 MG: 2 INJECTION INTRAMUSCULAR; INTRAVENOUS at 12:55

## 2025-02-26 RX ADMIN — IPRATROPIUM BROMIDE AND ALBUTEROL SULFATE 3 ML: .5; 3 SOLUTION RESPIRATORY (INHALATION) at 12:15

## 2025-02-26 RX ADMIN — SODIUM CHLORIDE, SODIUM LACTATE, POTASSIUM CHLORIDE, CALCIUM CHLORIDE 30 ML/HR: 20; 30; 600; 310 INJECTION, SOLUTION INTRAVENOUS at 11:22

## 2025-02-26 RX ADMIN — PROPOFOL 50 MG: 10 INJECTION, EMULSION INTRAVENOUS at 11:40

## 2025-02-26 NOTE — ANESTHESIA POSTPROCEDURE EVALUATION
Patient: Livia Zaman    Procedure Summary       Date: 02/26/25 Room / Location: [facility] ENDOSCOPY 1 / [facility] ENDOSCOPY    Anesthesia Start: 1138 Anesthesia Stop: 1209    Procedure: COLONOSCOPY Diagnosis:       Screening for malignant neoplasm of colon      History of colonic polyps      (Screening for malignant neoplasm of colon [Z12.11])      (History of colonic polyps [Z86.0100])    Surgeons: Umang Mares MD Provider: Yesica Suarez CRNA    Anesthesia Type: general ASA Status: 3            Anesthesia Type: general    Vitals  Vitals Value Taken Time   /57 02/26/25 1349   Temp 36.2 °C (97.2 °F) 02/26/25 1207   Pulse 103 02/26/25 1356   Resp 22 02/26/25 1247   SpO2 94 % 02/26/25 1356   Vitals shown include unfiled device data.        Post Anesthesia Care and Evaluation      Comments: 2nd neb was given to patient, pt's xray was unremarkable, pt's vitals are stable on room air, will discharge with incentive spirometer and again reminded patient and spouse to return to ER or urgent care if fever, soa , increased prod cough or pain with cough in chest develops, worsening symptoms or concerns develops, pt and spouse verb understanding.

## 2025-02-26 NOTE — ANESTHESIA POSTPROCEDURE EVALUATION
Patient: Livia Zaman    Procedure Summary       Date: 02/26/25 Room / Location: [facility] ENDOSCOPY 1 / [facility] ENDOSCOPY    Anesthesia Start: 1138 Anesthesia Stop: 1209    Procedure: COLONOSCOPY Diagnosis:       Screening for malignant neoplasm of colon      History of colonic polyps      (Screening for malignant neoplasm of colon [Z12.11])      (History of colonic polyps [Z86.0100])    Surgeons: Umang Mares MD Provider: Yesica Suarez CRNA    Anesthesia Type: general ASA Status: 3            Anesthesia Type: general    Vitals  Vitals Value Taken Time   /49 02/26/25 1334   Temp 36.2 °C (97.2 °F) 02/26/25 1207   Pulse 85 02/26/25 1336   Resp 22 02/26/25 1247   SpO2 95 % 02/26/25 1336   Vitals shown include unfiled device data.        Post Anesthesia Care and Evaluation      Comments: Pt's O2 started to desat to 86% on room air, with no activity , instructed to take deep breath breaths and coughs and sats will improve to 94-96% on room air, in no resp distress, lung sounds clear to ausc but will order portable chest xray at bedside d/t possible aspiration while during procedure and 2nd duoneb, also encourage patient to use incentive spirometry when d/c'd to home

## 2025-02-26 NOTE — ANESTHESIA POSTPROCEDURE EVALUATION
Patient: Livia Zaman    Procedure Summary       Date: 02/26/25 Room / Location: [facility] ENDOSCOPY 1 / [facility] ENDOSCOPY    Anesthesia Start: 1138 Anesthesia Stop: 1209    Procedure: COLONOSCOPY Diagnosis:       Screening for malignant neoplasm of colon      History of colonic polyps      (Screening for malignant neoplasm of colon [Z12.11])      (History of colonic polyps [Z86.0100])    Surgeons: Umang Mares MD Provider: Yesica Suarez CRNA    Anesthesia Type: general ASA Status: 3            Anesthesia Type: general    Vitals  Vitals Value Taken Time   /64 02/26/25 1253   Temp 36.2 °C (97.2 °F) 02/26/25 1207   Pulse 86 02/26/25 1258   Resp 22 02/26/25 1247   SpO2 92 % 02/26/25 1258   Vitals shown include unfiled device data.        Post Anesthesia Care and Evaluation    Patient location during evaluation: bedside  Patient participation: complete - patient participated  Level of consciousness: awake  Pain management: adequate    Airway patency: patent  Anesthetic complications: No anesthetic complications  PONV Status: controlled  Cardiovascular status: acceptable and stable  Respiratory status: acceptable    Comments: Pt projectile vomited during procedure. Pt suctioned, oral airway placed, and ambu bag until spontaneous respirations. Duoneb and incentive spirometry ordered in postop for O2 sats in 90s and diminished breath sounds. Pt also had nausea post-op and Zofran ordered. Pt's vital signs remain stable.  Instructed pt to go to nearest ER or urgent care if having any shortness of breath, fever, or signs/symptoms of aspiration. Pt agreed and verbalized understanding.

## 2025-02-26 NOTE — ANESTHESIA POSTPROCEDURE EVALUATION
Patient: Livia Zaman    Procedure Summary       Date: 02/26/25 Room / Location: [facility] ENDOSCOPY 1 / [facility] ENDOSCOPY    Anesthesia Start: 1138 Anesthesia Stop: 1209    Procedure: COLONOSCOPY Diagnosis:       Screening for malignant neoplasm of colon      History of colonic polyps      (Screening for malignant neoplasm of colon [Z12.11])      (History of colonic polyps [Z86.0100])    Surgeons: Umang Mares MD Provider: Yesica Suarez CRNA    Anesthesia Type: general ASA Status: 3            Anesthesia Type: general    Vitals  Vitals Value Taken Time   /49 02/26/25 1334   Temp 36.2 °C (97.2 °F) 02/26/25 1207   Pulse 83 02/26/25 1335   Resp 22 02/26/25 1247   SpO2 95 % 02/26/25 1335   Vitals shown include unfiled device data.        Anesthesia Post Evaluation

## 2025-02-26 NOTE — H&P
Chief Complaint: Colonoscopy    Patient is here to have a colonoscopy.  Following is a copy of the HPI from the patient's office visit at the surgery clinic.      History of Present Illness  Livia Zaman is a 54 y.o. female presents to [facility] for colonoscopy consultation.    Patient presents today without complaints for screening colonoscopy.  She denies any abdominal pain, change in bowel habit, or rectal bleeding.  Denies any family history of colorectal cancer.  Admits to history of colonic polyps    Patient does have a history of a neuro and her kidney and pancreatic mass.    Admits to ISHA.  Does use a CPAP.    Denies any cardiac issues.  Denies taking any GLP-1 receptors.    : Colonoscopy (Vishnu): Descending - tubular adenoma; diverticulosis.       Objective     Past Medical History:   Diagnosis Date    Allergic rhinitis     Anxiety     Arrhythmia 2015    PVC's    Cancer     PNET    Cholelithiasis     Removed     Colon polyp 2021    Dental disease large soft palate, large tongue    Dr Perez, Orthodontist    Depression     Diverticulitis of colon     Diverticulosis 2021    GERD (gastroesophageal reflux disease) last couple of years    Headache As a teen    Hypothyroidism 2019    Insomnia     Migraine     Obesity     Pancreatic cancer 2015    Neuroendocrine Tumor    Pancreatic lesion 2019    PVC (premature ventricular contraction)     Sleep apnea 2018    On cpap       Past Surgical History:   Procedure Laterality Date    BREAST BIOPSY      Left     SECTION      CHOLECYSTECTOMY      COLONOSCOPY N/A 12/15/2021    Procedure: COLONOSCOPY  with hot snare polypectomy;  Surgeon: Umang Mares MD;  Location: [facility] ENDOSCOPY;  Service: General;  Laterality: N/A;  diverticlosis, colon polyp, endo clip x1    HYSTERECTOMY  10/10/2024    LIPOMA EXCISION Right     Right shoulder    LIVER BIOPSY      laproscopic        Outpatient Medications Marked as Taking for the 1/9/25 encounter (Office Visit) with Marquita Florence APRN   Medication Sig Dispense Refill    Cetirizine HCl (ZyrTEC Allergy) 10 MG capsule Daily.      escitalopram (LEXAPRO) 20 MG tablet Take 1 tablet by mouth Daily.      famotidine (PEPCID) 20 MG tablet Take 1 tablet by mouth 2 (Two) Times a Day As Needed.      ibuprofen (ADVIL,MOTRIN) 600 MG tablet Take 1 tablet by mouth As Needed.      LORazepam (ATIVAN) 1 MG tablet TAKE 1/2 TABLET BY MOUTH TWICE DAILY AND TAKE 1 TABLET BY MOUTH AT BEDTIME AS NEEDED      metoprolol succinate XL (TOPROL-XL) 25 MG 24 hr tablet Take 1 tablet by mouth Daily. 90 tablet 3    Synthroid 137 MCG tablet       vitamin D (ERGOCALCIFEROL) 1.25 MG (97933 UT) capsule capsule          Allergies   Allergen Reactions    Desvenlafaxine Rash        Family History   Problem Relation Age of Onset    Hypertension Mother     Kidney disease Mother     Thyroid disease Mother     Migraines Mother     Restless legs syndrome Mother     Heart disease Father     Prostate cancer Father     Skin cancer Father     Cancer Father         Prostate/bone ca, melanoma    COPD Father     Arrhythmia Father         A fib, had ablation    Heart attack Father     Asthma Father     Hyperlipidemia Father     Insomnia Father     Early death Sister         Hit by drunk  1988    Hypertension Maternal Grandmother     Thyroid disease Maternal Grandmother     Heart disease Maternal Grandfather     Hypertension Maternal Grandfather     Prostate cancer Maternal Grandfather     Cancer Maternal Grandfather         Esophageal Ca    Vision loss Maternal Grandfather     Heart failure Maternal Grandfather     Heart disease Paternal Grandmother     Heart disease Paternal Grandfather     Hypertension Other         AUNT    Skin cancer Other         UNCLE    Malig Hyperthermia Neg Hx        Social History     Socioeconomic History    Marital status:    Tobacco Use    Smoking  status: Never    Smokeless tobacco: Never   Vaping Use    Vaping status: Never Used   Substance and Sexual Activity    Alcohol use: Yes     Alcohol/week: 1.0 standard drink of alcohol     Types: 1 Glasses of wine per week     Comment: 3-4 times per year    Drug use: Never    Sexual activity: Yes     Partners: Male     Birth control/protection: Post-menopausal, Hysterectomy     Comment: Postmenopausal     Physical Exam  Vitals - Available in the EMR.   Respiratory:  breathing not labored, respiratory effort appears normal  Cardiovascular:  heart regular rate  Musculoskeletal: moving all extremities symmetrically and purposefully      Assessment   Screening colonoscopy  Personal history of colon polyps    Plan  Colonoscopy    Risks and benefits discussed    Umang Mares M.D.  02/26/25    Electronically signed by Umang Mares MD, 02/26/25, 7:18 AM EST.   VIEW ALL

## 2025-06-17 ENCOUNTER — TELEPHONE (OUTPATIENT)
Dept: CARDIOLOGY | Facility: CLINIC | Age: 55
End: 2025-06-17
Payer: COMMERCIAL

## 2025-06-17 NOTE — TELEPHONE ENCOUNTER
REQUEST FOR CARDIAC CLEARANCE    Caller name: Livia Zaman     Phone Number: [phone]     Surgeon's name: DR.TRAVIS CALVERT     Type of planned surgery: GASTRIC SLEEVE     Date of planned surgery: 7/14/25    Type of anesthesia: GENERAL     Have you been experiencing chest pain or shortness of breath? NO     Is your doctor requesting for you to stop any of your medications prior to your surgery? NO     Where should we fax the clearance to? [email or web address]  [phone]

## (undated) DEVICE — GLV SURG BIOGEL LTX PF 7 1/2

## (undated) DEVICE — Device: Brand: DEFENDO AIR/WATER/SUCTION AND BIOPSY VALVE

## (undated) DEVICE — THE STERILE LIGHT HANDLE COVER IS USED WITH STERIS SURGICAL LIGHTING AND VISUALIZATION SYSTEMS.

## (undated) DEVICE — THE SINGLE USE ETRAP – POLYP TRAP IS USED FOR SUCTION RETRIEVAL OF ENDOSCOPICALLY REMOVED POLYPS.: Brand: ETRAP

## (undated) DEVICE — SOL IRRG H2O PL/BG 1000ML STRL

## (undated) DEVICE — SOLIDIFIER LIQLOC PLS 1500CC BT

## (undated) DEVICE — COLON KIT: Brand: MEDLINE INDUSTRIES, INC.

## (undated) DEVICE — CONN JET HYDRA H20 AUXILIARY DISP

## (undated) DEVICE — Device

## (undated) DEVICE — SOL IRR NACL 0.9PCT BO 1000ML

## (undated) DEVICE — LINER SURG CANSTR SXN S/RIGD 1500CC

## (undated) DEVICE — SNAR E/S POLYP SNAREMASTER OVL/10MM 2.8X2300MM YEL